# Patient Record
Sex: MALE | Race: WHITE
[De-identification: names, ages, dates, MRNs, and addresses within clinical notes are randomized per-mention and may not be internally consistent; named-entity substitution may affect disease eponyms.]

---

## 2017-03-13 ENCOUNTER — HOSPITAL ENCOUNTER (OUTPATIENT)
Dept: HOSPITAL 47 - LABWHC1 | Age: 67
End: 2017-03-13
Payer: MEDICARE

## 2017-03-13 DIAGNOSIS — J06.9: Primary | ICD-10-CM

## 2017-03-13 LAB
BASOPHILS # BLD AUTO: 0 K/UL (ref 0–0.2)
BASOPHILS NFR BLD AUTO: 0 %
CH: 30.6
CHCM: 31.7
EOSINOPHIL # BLD AUTO: 0.3 K/UL (ref 0–0.7)
EOSINOPHIL NFR BLD AUTO: 3 %
ERYTHROCYTE [DISTWIDTH] IN BLOOD BY AUTOMATED COUNT: 4.66 M/UL (ref 4.3–5.9)
ERYTHROCYTE [DISTWIDTH] IN BLOOD: 12.6 % (ref 11.5–15.5)
HCT VFR BLD AUTO: 45.2 % (ref 39–53)
HDW: 2.16
HGB BLD-MCNC: 14.2 GM/DL (ref 13–17.5)
LUC NFR BLD AUTO: 4 %
LYMPHOCYTES # SPEC AUTO: 2.8 K/UL (ref 1–4.8)
LYMPHOCYTES NFR SPEC AUTO: 32 %
MCH RBC QN AUTO: 30.5 PG (ref 25–35)
MCHC RBC AUTO-ENTMCNC: 31.4 G/DL (ref 31–37)
MCV RBC AUTO: 97 FL (ref 80–100)
MONOCYTES # BLD AUTO: 0.5 K/UL (ref 0–1)
MONOCYTES NFR BLD AUTO: 6 %
NEUTROPHILS # BLD AUTO: 4.8 K/UL (ref 1.3–7.7)
NEUTROPHILS NFR BLD AUTO: 55 %
WBC # BLD AUTO: 0.3 10*3/UL
WBC # BLD AUTO: 8.8 K/UL (ref 3.8–10.6)
WBC (PEROX): 8.91

## 2017-03-13 PROCEDURE — 87502 INFLUENZA DNA AMP PROBE: CPT

## 2017-03-13 PROCEDURE — 85025 COMPLETE CBC W/AUTO DIFF WBC: CPT

## 2017-03-13 PROCEDURE — 36415 COLL VENOUS BLD VENIPUNCTURE: CPT

## 2017-03-13 PROCEDURE — 99212 OFFICE O/P EST SF 10 MIN: CPT

## 2018-11-16 ENCOUNTER — HOSPITAL ENCOUNTER (EMERGENCY)
Dept: HOSPITAL 47 - EC | Age: 68
Discharge: HOME | End: 2018-11-16
Payer: MEDICARE

## 2018-11-16 VITALS
DIASTOLIC BLOOD PRESSURE: 89 MMHG | TEMPERATURE: 98.2 F | HEART RATE: 62 BPM | SYSTOLIC BLOOD PRESSURE: 135 MMHG | RESPIRATION RATE: 20 BRPM

## 2018-11-16 DIAGNOSIS — Z79.899: ICD-10-CM

## 2018-11-16 DIAGNOSIS — M94.0: Primary | ICD-10-CM

## 2018-11-16 DIAGNOSIS — R93.89: ICD-10-CM

## 2018-11-16 DIAGNOSIS — Z99.89: ICD-10-CM

## 2018-11-16 DIAGNOSIS — I10: ICD-10-CM

## 2018-11-16 DIAGNOSIS — Z79.82: ICD-10-CM

## 2018-11-16 DIAGNOSIS — Z79.891: ICD-10-CM

## 2018-11-16 DIAGNOSIS — G89.29: ICD-10-CM

## 2018-11-16 DIAGNOSIS — Z99.81: ICD-10-CM

## 2018-11-16 DIAGNOSIS — Z88.8: ICD-10-CM

## 2018-11-16 DIAGNOSIS — J44.9: ICD-10-CM

## 2018-11-16 DIAGNOSIS — Z87.891: ICD-10-CM

## 2018-11-16 DIAGNOSIS — Z91.041: ICD-10-CM

## 2018-11-16 DIAGNOSIS — G47.30: ICD-10-CM

## 2018-11-16 LAB
ALBUMIN SERPL-MCNC: 3.9 G/DL (ref 3.5–5)
ALP SERPL-CCNC: 64 U/L (ref 38–126)
ALT SERPL-CCNC: 22 U/L (ref 21–72)
ANION GAP SERPL CALC-SCNC: 7 MMOL/L
AST SERPL-CCNC: 24 U/L (ref 17–59)
BASOPHILS # BLD AUTO: 0 K/UL (ref 0–0.2)
BASOPHILS NFR BLD AUTO: 1 %
BUN SERPL-SCNC: 14 MG/DL (ref 9–20)
CALCIUM SPEC-MCNC: 9 MG/DL (ref 8.4–10.2)
CHLORIDE SERPL-SCNC: 99 MMOL/L (ref 98–107)
CK SERPL-CCNC: 93 U/L (ref 55–170)
CO2 SERPL-SCNC: 32 MMOL/L (ref 22–30)
EOSINOPHIL # BLD AUTO: 0.3 K/UL (ref 0–0.7)
EOSINOPHIL NFR BLD AUTO: 3 %
ERYTHROCYTE [DISTWIDTH] IN BLOOD BY AUTOMATED COUNT: 4.54 M/UL (ref 4.3–5.9)
ERYTHROCYTE [DISTWIDTH] IN BLOOD: 13 % (ref 11.5–15.5)
GLUCOSE SERPL-MCNC: 214 MG/DL (ref 74–99)
HCT VFR BLD AUTO: 43.3 % (ref 39–53)
HGB BLD-MCNC: 13.8 GM/DL (ref 13–17.5)
LYMPHOCYTES # SPEC AUTO: 2.1 K/UL (ref 1–4.8)
LYMPHOCYTES NFR SPEC AUTO: 28 %
MAGNESIUM SPEC-SCNC: 2.1 MG/DL (ref 1.6–2.3)
MCH RBC QN AUTO: 30.4 PG (ref 25–35)
MCHC RBC AUTO-ENTMCNC: 31.8 G/DL (ref 31–37)
MCV RBC AUTO: 95.5 FL (ref 80–100)
MONOCYTES # BLD AUTO: 0.5 K/UL (ref 0–1)
MONOCYTES NFR BLD AUTO: 7 %
NEUTROPHILS # BLD AUTO: 4.5 K/UL (ref 1.3–7.7)
NEUTROPHILS NFR BLD AUTO: 60 %
PLATELET # BLD AUTO: 313 K/UL (ref 150–450)
POTASSIUM SERPL-SCNC: 4.5 MMOL/L (ref 3.5–5.1)
PROT SERPL-MCNC: 6.9 G/DL (ref 6.3–8.2)
SODIUM SERPL-SCNC: 138 MMOL/L (ref 137–145)
TROPONIN I SERPL-MCNC: <0.012 NG/ML (ref 0–0.03)
WBC # BLD AUTO: 7.5 K/UL (ref 3.8–10.6)

## 2018-11-16 PROCEDURE — 36415 COLL VENOUS BLD VENIPUNCTURE: CPT

## 2018-11-16 PROCEDURE — 84484 ASSAY OF TROPONIN QUANT: CPT

## 2018-11-16 PROCEDURE — 83735 ASSAY OF MAGNESIUM: CPT

## 2018-11-16 PROCEDURE — 82550 ASSAY OF CK (CPK): CPT

## 2018-11-16 PROCEDURE — 85025 COMPLETE CBC W/AUTO DIFF WBC: CPT

## 2018-11-16 PROCEDURE — 93005 ELECTROCARDIOGRAM TRACING: CPT

## 2018-11-16 PROCEDURE — 85379 FIBRIN DEGRADATION QUANT: CPT

## 2018-11-16 PROCEDURE — 82553 CREATINE MB FRACTION: CPT

## 2018-11-16 PROCEDURE — 99285 EMERGENCY DEPT VISIT HI MDM: CPT

## 2018-11-16 PROCEDURE — 80053 COMPREHEN METABOLIC PANEL: CPT

## 2018-11-16 PROCEDURE — 96374 THER/PROPH/DIAG INJ IV PUSH: CPT

## 2018-11-16 PROCEDURE — 96375 TX/PRO/DX INJ NEW DRUG ADDON: CPT

## 2018-11-16 PROCEDURE — 71275 CT ANGIOGRAPHY CHEST: CPT

## 2018-11-16 NOTE — CT
EXAMINATION TYPE: CT angio chest

 

DATE OF EXAM: 11/16/2018 5:06 PM

 

COMPARISON: None

 

HISTORY: SOB. hx blood cot in his leg 2 yrs ago

 

CT DLP: 696.6 mGycm

Automated exposure control for dose reduction was used.

 

CONTRAST: 

CTA scan of the thorax is performed with IV Contrast, patient injected with 100 mL of Isovue 370, pul
monary embolism protocol.  There are 3-D post processed images..  

 

FINDINGS:

 

 

There is pulmonary emphysema. There is bullous disease throughout both lungs. There is some patchy re
ticular nodular infiltrate at the lung bases and more on the left side. There is no evidence of a pul
monary mass. There is no pleural effusion. Heart size is normal. There is no pericardial effusion. Th
oracic aorta is intact without evidence of aneurysm or dissection. I see no filling defects in the pu
lmonary arteries. There are no hilar masses. There is no mediastinal adenopathy. The bony thorax is i
ntact. There is spurring in the thoracic spine. The ribs appear intact.

 

IMPRESSION: 

NO EVIDENCE OF PULMONARY EMBOLISM.

 

EMPHYSEMA. RETICULAR NODULAR INFILTRATES AT THE LUNG BASES CONSISTENT WITH INFLAMMATORY DISEASE.

## 2018-11-16 NOTE — ED
General Adult HPI





- General


Chief complaint: Shortness of Breath


Stated complaint: Blood clot in right lung


Time Seen by Provider: 11/16/18 15:28


Source: patient, RN notes reviewed


Mode of arrival: wheelchair


Limitations: no limitations





- History of Present Illness


Initial comments: 





This is a 8-year-old male with a history of pneumonia about a month ago and a 

history of DVT to his right lower extremity about 2 years ago who states he was 

sent over from a doctor's office with a request for evaluation for possible 

pulmonary embolus.  Patient's had right-sided anterior chest pain sharp in 

nature it does get worse with movement and deep breathing.  He was on 

antibiotics for the pneumonia that is cleared up with chest pain is been 

persistent.  Sharp in nature mild to moderate in severity.  He denies any other 

complaints she denies any leg or calf pain and swelling.  Fevers chills nausea 

vomiting sweats or other symptoms at this time.





- Related Data


 Home Medications











 Medication  Instructions  Recorded  Confirmed


 


Multivitamin [Men's Multi-Vitamin] 1 tab PO DAILY 11/23/15 11/16/18


 


Spironolactone [Aldactone] 25 mg PO DAILY 11/23/15 11/16/18


 


Cholecalciferol [Vitamin D3] 400 unit PO DAILY 06/23/16 11/16/18


 


Dilaudid Through Pump 3.5 mg INTRATHECA CONTINUOUS 06/23/16 11/16/18


 


EPINEPHrine [Epipen Jr 2-Logan] 0.15 mg IM ONCE PRN 06/23/16 11/16/18


 


Lisinopril 5 mg PO DAILY 06/23/16 11/16/18


 


Omega-3 Fatty Acids/Fish Oil [Fish 1 cap PO DAILY 06/23/16 11/16/18





Oil 1,000 mg Softgel]   


 


Aspirin EC [Ecotrin Low Dose] 81 mg PO DAILY 11/16/18 11/16/18


 


Furosemide [Lasix] 20 mg PO DAILY 11/16/18 11/16/18








 Previous Rx's











 Medication  Instructions  Recorded


 


Ibuprofen 800 mg PO Q6HR PRN #20 tablet 11/16/18


 


Ketorolac [Toradol] 10 mg PO Q6HR #20 tab 11/16/18











 Allergies











Allergy/AdvReac Type Severity Reaction Status Date / Time


 


Iodinated Contrast- Oral and Allergy  Anaphylaxis Verified 11/16/18 16:15





IV Dye     





[Iodinated Contrast Media -     





IV Dye]     


 


metformin AdvReac  Abdominal Verified 11/16/18 16:15





   Pain  














Review of Systems


ROS Statement: 


Those systems with pertinent positive or pertinent negative responses have been 

documented in the HPI.





ROS Other: All systems not noted in ROS Statement are negative.





Past Medical History


Past Medical History: COPD, Hypertension, Pneumonia, Sleep Apnea/CPAP/BIPAP


Additional Past Medical History / Comment(s): Hx. of Diverticulitis, Pneumonia 

2 yrs. ago, wears oxygen @ 5L. Complains of L sided abd. pain.  chronic back 

pain


History of Any Multi-Drug Resistant Organisms: None Reported


Past Surgical History: Appendectomy, Cholecystectomy


Additional Past Surgical History / Comment(s): Colonoscopy


Past Anesthesia/Blood Transfusion Reactions: No Reported Reaction


Past Psychological History: No Psychological Hx Reported


Smoking Status: Former smoker


Past Alcohol Use History: None Reported, Rare


Past Drug Use History: None Reported





- Past Family History


  ** Brother(s)


Family Medical History: Cancer


Additional Family Medical History / Comment(s): Colon





General Exam





- General Exam Comments


Initial Comments: 





This is a well-developed well-nourished awake alert oriented 5 male


Limitations: no limitations


General appearance: alert, in no apparent distress


Head exam: Present: atraumatic, normocephalic, normal inspection


Eye exam: Present: normal appearance, PERRL, EOMI.  Absent: scleral icterus, 

conjunctival injection, periorbital swelling


ENT exam: Present: normal exam, mucous membranes moist


Neck exam: Present: normal inspection.  Absent: tenderness, meningismus, 

lymphadenopathy


Respiratory exam: Present: normal lung sounds bilaterally, chest wall 

tenderness (Sweats patient across the anterior right chest wall at the costal 

sternal margin.  No step-off or crepitation.).  Absent: respiratory distress, 

wheezes, rales, rhonchi, stridor


Cardiovascular Exam: Present: regular rate, normal rhythm, normal heart sounds.

  Absent: systolic murmur, diastolic murmur, rubs, gallop, clicks


GI/Abdominal exam: Present: soft, normal bowel sounds.  Absent: distended, 

tenderness, guarding, rebound, rigid


Extremities exam: Present: normal inspection, full ROM, normal capillary refill

, other (No Homans sign).  Absent: tenderness, pedal edema, joint swelling, 

calf tenderness


Back exam: Present: normal inspection


Neurological exam: Present: alert, oriented X3, CN II-XII intact


Psychiatric exam: Present: normal affect, normal mood


Skin exam: Present: warm, dry, intact, normal color.  Absent: rash





Course


 Vital Signs











  11/16/18 11/16/18





  15:21 16:03


 


Temperature 98.4 F 


 


Pulse Rate 69 


 


Respiratory 16 24





Rate  


 


Blood Pressure 154/82 


 


O2 Sat by Pulse 95 





Oximetry  














EKG Findings





- EKG Results:


EKG: interpreted by ERMD, sinus rhythm (Sinus rhythm with sinus arrhythmia rate 

was 60.  A 150 QRS duration 96 daily since /434 low-voltage QRS 

nonspecific T-wave configuration)





Medical Decision Making





- Medical Decision Making





I did have a long discussion the patient regarding the findings the 

presentation is consistent with costochondritis





- Lab Data


Result diagrams: 


 11/16/18 16:00





 11/16/18 16:00


 Lab Results











  11/16/18 11/16/18 11/16/18 Range/Units





  16:00 16:00 16:00 


 


WBC   7.5   (3.8-10.6)  k/uL


 


RBC   4.54   (4.30-5.90)  m/uL


 


Hgb   13.8   (13.0-17.5)  gm/dL


 


Hct   43.3   (39.0-53.0)  %


 


MCV   95.5   (80.0-100.0)  fL


 


MCH   30.4   (25.0-35.0)  pg


 


MCHC   31.8   (31.0-37.0)  g/dL


 


RDW   13.0   (11.5-15.5)  %


 


Plt Count   313   (150-450)  k/uL


 


Neutrophils %   60   %


 


Lymphocytes %   28   %


 


Monocytes %   7   %


 


Eosinophils %   3   %


 


Basophils %   1   %


 


Neutrophils #   4.5   (1.3-7.7)  k/uL


 


Lymphocytes #   2.1   (1.0-4.8)  k/uL


 


Monocytes #   0.5   (0-1.0)  k/uL


 


Eosinophils #   0.3   (0-0.7)  k/uL


 


Basophils #   0.0   (0-0.2)  k/uL


 


D-Dimer     (<0.60)  mg/L FEU


 


Sodium    138  (137-145)  mmol/L


 


Potassium    4.5  (3.5-5.1)  mmol/L


 


Chloride    99  ()  mmol/L


 


Carbon Dioxide    32 H  (22-30)  mmol/L


 


Anion Gap    7  mmol/L


 


BUN    14  (9-20)  mg/dL


 


Creatinine    0.56 L  (0.66-1.25)  mg/dL


 


Est GFR (CKD-EPI)AfAm    >90  (>60 ml/min/1.73 sqM)  


 


Est GFR (CKD-EPI)NonAf    >90  (>60 ml/min/1.73 sqM)  


 


Glucose    214 H  (74-99)  mg/dL


 


Calcium    9.0  (8.4-10.2)  mg/dL


 


Magnesium    2.1  (1.6-2.3)  mg/dL


 


Total Bilirubin    0.4  (0.2-1.3)  mg/dL


 


AST    24  (17-59)  U/L


 


ALT    22  (21-72)  U/L


 


Alkaline Phosphatase    64  ()  U/L


 


Total Creatine Kinase  93    ()  U/L


 


CK-MB (CK-2)  1.9    (0.0-2.4)  ng/mL


 


CK-MB (CK-2) Rel Index  2.0    


 


Troponin I  <0.012    (0.000-0.034)  ng/mL


 


Total Protein    6.9  (6.3-8.2)  g/dL


 


Albumin    3.9  (3.5-5.0)  g/dL














  11/16/18 Range/Units





  16:00 


 


WBC   (3.8-10.6)  k/uL


 


RBC   (4.30-5.90)  m/uL


 


Hgb   (13.0-17.5)  gm/dL


 


Hct   (39.0-53.0)  %


 


MCV   (80.0-100.0)  fL


 


MCH   (25.0-35.0)  pg


 


MCHC   (31.0-37.0)  g/dL


 


RDW   (11.5-15.5)  %


 


Plt Count   (150-450)  k/uL


 


Neutrophils %   %


 


Lymphocytes %   %


 


Monocytes %   %


 


Eosinophils %   %


 


Basophils %   %


 


Neutrophils #   (1.3-7.7)  k/uL


 


Lymphocytes #   (1.0-4.8)  k/uL


 


Monocytes #   (0-1.0)  k/uL


 


Eosinophils #   (0-0.7)  k/uL


 


Basophils #   (0-0.2)  k/uL


 


D-Dimer  0.76 H  (<0.60)  mg/L FEU


 


Sodium   (137-145)  mmol/L


 


Potassium   (3.5-5.1)  mmol/L


 


Chloride   ()  mmol/L


 


Carbon Dioxide   (22-30)  mmol/L


 


Anion Gap   mmol/L


 


BUN   (9-20)  mg/dL


 


Creatinine   (0.66-1.25)  mg/dL


 


Est GFR (CKD-EPI)AfAm   (>60 ml/min/1.73 sqM)  


 


Est GFR (CKD-EPI)NonAf   (>60 ml/min/1.73 sqM)  


 


Glucose   (74-99)  mg/dL


 


Calcium   (8.4-10.2)  mg/dL


 


Magnesium   (1.6-2.3)  mg/dL


 


Total Bilirubin   (0.2-1.3)  mg/dL


 


AST   (17-59)  U/L


 


ALT   (21-72)  U/L


 


Alkaline Phosphatase   ()  U/L


 


Total Creatine Kinase   ()  U/L


 


CK-MB (CK-2)   (0.0-2.4)  ng/mL


 


CK-MB (CK-2) Rel Index   


 


Troponin I   (0.000-0.034)  ng/mL


 


Total Protein   (6.3-8.2)  g/dL


 


Albumin   (3.5-5.0)  g/dL














- Radiology Data


Radiology results: report reviewed (Patient CAT scan is negative for acute 

findings no PE there is some inflammatory changes this would be consistent with 

the patient's resolving pneumonia.), image reviewed





Disposition


Clinical Impression: 


 Chest wall pain, Costochondritis, acute





Disposition: HOME SELF-CARE


Condition: Good


Instructions:  Costochondritis (ED), Chest Wall Pain (ED)


Prescriptions: 


Ibuprofen 800 mg PO Q6HR PRN #20 tablet


 PRN Reason: Pain


Ketorolac [Toradol] 10 mg PO Q6HR #20 tab


Is patient prescribed a controlled substance at d/c from ED?: No


Referrals: 


Isac Hayward MD [Primary Care Provider] - 1-2 days

## 2019-10-03 NOTE — P.GSHP
History of Present Illness


H&P Date: 10/04/19














CHIEF COMPLAINT: GERD





HISTORY OF PRESENT ILLNESS: The patient is a 69-year-old male who


presents reports gastroesophageal reflux disease.  Upper endoscopy was offered 

for further evaluation and management.





PAST MEDICAL HISTORY: 


Please see list.





PAST SURGICAL HISTORY: 


Please see list.





MEDICATIONS: 


Please see list.





ALLERGIES:  Please see list. 





SOCIAL HISTORY: No illicit drug use





FAMILY HISTORY: No reports of Crohn disease or ulcerative colitis. 





REVIEW OF ORGAN SYSTEMS: 


CONSTITUTIONAL: No reports of fevers or chills. 


GI:  Denies any blood in stools or constipation. 





PHYSICAL EXAM: 


VITAL SIGNS:  Stable


GENERAL: Well-developed and pleasant in no acute distress. 


HEENT: No scleral icterus. Extraocular movements grossly


intact. Moist buccal mucosa. 


NECK: Supple without lymphadenopathy. 


CHEST: Unlabored respirations. Equal bilateral excursions. 


CARDIOVASCULAR: Regular rate and rhythm. Distal 2+ pulses. 


ABDOMEN: Soft, nondistended.  


MUSCULOSKELETAL: No clubbing, cyanosis, or edema. 





ASSESSMENT: 


1.  Gastroesophageal reflux disease





PLAN: 


1. Recommend proceeding with an upper endoscopy





Past Medical History


Past Medical History: COPD, Deep Vein Thrombosis (DVT), GERD/Reflux, 

Hypertension, Pneumonia, Sleep Apnea/CPAP/BIPAP


Additional Past Medical History / Comment(s): Hx. of Diverticulitis, Pneumonia 2

yrs. ago, uses oxygen @ 5L,  chronic back pain, right upper quadrant pain, uses 

CPAP, hx. DVT leg 4 yrs ago


History of Any Multi-Drug Resistant Organisms: None Reported


Past Surgical History: Appendectomy, Bowel Resection, Cholecystectomy


Additional Past Surgical History / Comment(s): Colonoscopy, exp. laparotomy


Past Anesthesia/Blood Transfusion Reactions: No Reported Reaction


Smoking Status: Former smoker





- Past Family History


  ** Brother(s)


Family Medical History: Cancer


Additional Family Medical History / Comment(s): Colon





Medications and Allergies


                                Home Medications











 Medication  Instructions  Recorded  Confirmed  Type


 


Multivitamin [Men's Multi-Vitamin] 1 tab PO DAILY 11/23/15 10/03/19 History


 


Spironolactone [Aldactone] 25 mg PO DAILY 11/23/15 10/03/19 History


 


Cholecalciferol [Vitamin D3] 400 unit PO DAILY 06/23/16 10/03/19 History


 


Dilaudid Through Pump 3.5 mg INTRATHECA CONTINUOUS 06/23/16 10/03/19 History


 


EPINEPHrine [Epipen Jr 2-Logan] 0.15 mg IM ONCE PRN 06/23/16 10/03/19 History


 


Lisinopril 5 mg PO DAILY 06/23/16 10/03/19 History


 


Omega-3 Fatty Acids/Fish Oil [Fish 1 cap PO DAILY 06/23/16 10/03/19 History





Oil 1,000 mg Softgel]    


 


Aspirin EC [Ecotrin Low Dose] 81 mg PO DAILY 11/16/18 10/03/19 History


 


Furosemide [Lasix] 20 mg PO DAILY 11/16/18 10/03/19 History


 


Ibuprofen 800 mg PO Q6HR PRN #20 tablet 11/16/18 10/03/19 Rx


 


Albuterol Nebulized [Ventolin 2.5 mg INHALATION Q6H 10/03/19 10/03/19 History





Nebulized]    


 


Albuterol Sulfate [Proair Hfa] 1 - 2 puff INHALATION Q6HR PRN 10/03/19 10/03/19 

History


 


Atorvastatin [Lipitor] 10 mg PO DAILY 10/03/19 10/03/19 History








                                    Allergies











Allergy/AdvReac Type Severity Reaction Status Date / Time


 


Iodinated Contrast Media Allergy  Anaphylaxis Verified 10/03/19 09:47





[Iodinated Contrast Media -     





IV Dye]     


 


metformin AdvReac  Abdominal Verified 10/03/19 09:47





   Pain

## 2019-10-04 ENCOUNTER — HOSPITAL ENCOUNTER (OUTPATIENT)
Dept: HOSPITAL 47 - ORWHC2ENDO | Age: 69
Discharge: HOME | End: 2019-10-04
Payer: MEDICARE

## 2019-10-04 VITALS — BODY MASS INDEX: 39.2 KG/M2

## 2019-10-04 VITALS — HEART RATE: 77 BPM | RESPIRATION RATE: 18 BRPM | DIASTOLIC BLOOD PRESSURE: 73 MMHG | SYSTOLIC BLOOD PRESSURE: 123 MMHG

## 2019-10-04 VITALS — TEMPERATURE: 98.3 F

## 2019-10-04 DIAGNOSIS — E66.01: ICD-10-CM

## 2019-10-04 DIAGNOSIS — K31.89: ICD-10-CM

## 2019-10-04 DIAGNOSIS — G89.29: ICD-10-CM

## 2019-10-04 DIAGNOSIS — E78.5: ICD-10-CM

## 2019-10-04 DIAGNOSIS — Z87.01: ICD-10-CM

## 2019-10-04 DIAGNOSIS — K29.50: Primary | ICD-10-CM

## 2019-10-04 DIAGNOSIS — Z86.718: ICD-10-CM

## 2019-10-04 DIAGNOSIS — Z91.038: ICD-10-CM

## 2019-10-04 DIAGNOSIS — Z99.89: ICD-10-CM

## 2019-10-04 DIAGNOSIS — G47.30: ICD-10-CM

## 2019-10-04 DIAGNOSIS — Z88.8: ICD-10-CM

## 2019-10-04 DIAGNOSIS — Z87.19: ICD-10-CM

## 2019-10-04 DIAGNOSIS — Z87.891: ICD-10-CM

## 2019-10-04 DIAGNOSIS — J44.9: ICD-10-CM

## 2019-10-04 DIAGNOSIS — K21.0: ICD-10-CM

## 2019-10-04 DIAGNOSIS — Z79.891: ICD-10-CM

## 2019-10-04 DIAGNOSIS — I10: ICD-10-CM

## 2019-10-04 DIAGNOSIS — Z80.0: ICD-10-CM

## 2019-10-04 DIAGNOSIS — K22.10: ICD-10-CM

## 2019-10-04 DIAGNOSIS — Z79.82: ICD-10-CM

## 2019-10-04 DIAGNOSIS — Z91.041: ICD-10-CM

## 2019-10-04 DIAGNOSIS — Z79.899: ICD-10-CM

## 2019-10-04 DIAGNOSIS — Z90.49: ICD-10-CM

## 2019-10-04 DIAGNOSIS — K44.9: ICD-10-CM

## 2019-10-04 PROCEDURE — 88305 TISSUE EXAM BY PATHOLOGIST: CPT

## 2019-10-04 PROCEDURE — 43239 EGD BIOPSY SINGLE/MULTIPLE: CPT

## 2019-10-30 ENCOUNTER — HOSPITAL ENCOUNTER (OUTPATIENT)
Dept: HOSPITAL 47 - RADCTMAIN | Age: 69
Discharge: HOME | End: 2019-10-30
Payer: MEDICARE

## 2019-10-30 DIAGNOSIS — K57.90: Primary | ICD-10-CM

## 2019-10-30 DIAGNOSIS — K22.8: ICD-10-CM

## 2019-10-30 DIAGNOSIS — K57.32: ICD-10-CM

## 2019-10-30 LAB — BUN SERPL-SCNC: 14 MG/DL (ref 9–20)

## 2019-10-30 PROCEDURE — 74220 X-RAY XM ESOPHAGUS 1CNTRST: CPT

## 2019-10-30 PROCEDURE — 84520 ASSAY OF UREA NITROGEN: CPT

## 2019-10-30 PROCEDURE — 74177 CT ABD & PELVIS W/CONTRAST: CPT

## 2019-10-30 PROCEDURE — 36415 COLL VENOUS BLD VENIPUNCTURE: CPT

## 2019-10-30 PROCEDURE — 82565 ASSAY OF CREATININE: CPT

## 2019-10-30 NOTE — CT
EXAMINATION TYPE: CT abdomen pelvis w con

 

DATE OF EXAM: 10/30/2019

 

COMPARISON: 6/23/2016

 

INDICATION: Diverticulitis

 

DLP: 2099.50 mGycm, Automated exposure control for dose reduction was used.

 

CONTRAST:  100 ml mL of Isovue 300. 

                        Study performed with Oral Contrast

 

TECHNIQUE: Axial images were obtained from above the diaphragm to the pubic rami in the axial plane a
t 5 mm thick sections.  Reconstructed images are reviewed on the computer in the coronal plane.  

 

FINDINGS:

 

Limited CT sections are obtained the lung bases.  The lung bases are clear.

 

CT ABDOMEN:

Electronic device overlies anterolateral right abdomen.

 

Liver: Normal

 

Spleen: Normal

 

Pancreas: Fatty infiltration to the pancreas.

 

Adrenal glands: The adrenal glands are normal.

 

Gallbladder: Normal  

 

Kidneys: No masses are evident. No hydronephrosis is present.   No cysts are present.  Delayed images
 were obtained through the kidneys, which remain unremarkable.

 

Aorta: Vascular calcification is within the aorta. 

 

Inferior vena cava: Normal.

 

CT PELVIS: 

Loops of bowel within the abdomen and pelvis are normal.     There are some loops of bowel with limit
ed distention or lacking oral contrast limiting their evaluation. Prior bowel surgeries at the distal
 sigmoid colon. Scattered diverticuli may be present. No acute diverticulitis is evident.

 

Appendix: Normal as visualized.

 

Urinary bladder: Normal. 

 

Genitourinary structures: Prostate appears normal.

 

Osseous structures: No suspicious lytic or sclerotic lesions. Some facet degenerative changes present
.

 

IMPRESSIONS:

1.  No suspicious acute changes evident.

2. Mild diverticulosis without acute diverticulitis.

## 2019-10-30 NOTE — FL
EXAMINATION TYPE: FL barium swallow

 

DATE OF EXAM: 10/30/2019

 

COMPARISON: None

 

HISTORY: Gastroesophageal reflux, abdominal pain always feels hungry

 

TECHNIQUE: Double air contrast technique

Fluoroscopy time: 1 minute 3 seconds

Images: 16

 

FINDINGS:

The esophagus has a normal course to the distal esophageal junction. Multiple secondary and tertiary 
contractions are evident throughout the examination. Esophagus dilates to normal caliber. No intralum
inal defects are evident. Extrarenal defects are not evident.

 

Note is made of aspiration during the exam.

 

There is incomplete stripping esophageal bolus the horizontal drinking position. As incomplete stripp
ing of the esophageal bolus and horizontal drinking position. 

 

IMPRESSIONS:

1. Presbyesophagus. Multiple large tertiary as well as secondary contractions were evident during exa
m.

2. Aspiration during the exam.

## 2019-12-04 ENCOUNTER — HOSPITAL ENCOUNTER (OUTPATIENT)
Dept: HOSPITAL 47 - ORWHC2ENDO | Age: 69
Discharge: HOME | End: 2019-12-04
Payer: MEDICARE

## 2019-12-04 VITALS — BODY MASS INDEX: 37.2 KG/M2

## 2019-12-04 VITALS — TEMPERATURE: 98.3 F

## 2019-12-04 VITALS — RESPIRATION RATE: 18 BRPM | DIASTOLIC BLOOD PRESSURE: 89 MMHG | SYSTOLIC BLOOD PRESSURE: 151 MMHG | HEART RATE: 68 BPM

## 2019-12-04 DIAGNOSIS — Z90.49: ICD-10-CM

## 2019-12-04 DIAGNOSIS — Z80.0: ICD-10-CM

## 2019-12-04 DIAGNOSIS — K22.4: ICD-10-CM

## 2019-12-04 DIAGNOSIS — F90.9: ICD-10-CM

## 2019-12-04 DIAGNOSIS — K21.0: ICD-10-CM

## 2019-12-04 DIAGNOSIS — Z86.718: ICD-10-CM

## 2019-12-04 DIAGNOSIS — G89.29: ICD-10-CM

## 2019-12-04 DIAGNOSIS — E66.01: ICD-10-CM

## 2019-12-04 DIAGNOSIS — K22.8: ICD-10-CM

## 2019-12-04 DIAGNOSIS — Z87.891: ICD-10-CM

## 2019-12-04 DIAGNOSIS — J44.9: ICD-10-CM

## 2019-12-04 DIAGNOSIS — G62.9: ICD-10-CM

## 2019-12-04 DIAGNOSIS — Z96.9: ICD-10-CM

## 2019-12-04 DIAGNOSIS — Z87.01: ICD-10-CM

## 2019-12-04 DIAGNOSIS — Z99.89: ICD-10-CM

## 2019-12-04 DIAGNOSIS — Z79.899: ICD-10-CM

## 2019-12-04 DIAGNOSIS — I10: ICD-10-CM

## 2019-12-04 DIAGNOSIS — Z79.82: ICD-10-CM

## 2019-12-04 DIAGNOSIS — Z91.041: ICD-10-CM

## 2019-12-04 DIAGNOSIS — K29.50: Primary | ICD-10-CM

## 2019-12-04 DIAGNOSIS — Z91.030: ICD-10-CM

## 2019-12-04 DIAGNOSIS — G47.33: ICD-10-CM

## 2019-12-04 DIAGNOSIS — Z88.8: ICD-10-CM

## 2019-12-04 LAB — GLUCOSE BLD-MCNC: 139 MG/DL (ref 75–99)

## 2019-12-04 PROCEDURE — 43249 ESOPH EGD DILATION <30 MM: CPT

## 2019-12-04 PROCEDURE — 43248 EGD GUIDE WIRE INSERTION: CPT

## 2019-12-04 PROCEDURE — 43239 EGD BIOPSY SINGLE/MULTIPLE: CPT

## 2019-12-04 PROCEDURE — 88305 TISSUE EXAM BY PATHOLOGIST: CPT

## 2019-12-04 NOTE — P.GSHP
History of Present Illness


H&P Date: 12/04/19














CHIEF COMPLAINT: GERD





HISTORY OF PRESENT ILLNESS: The patient is a 69-year-old male who


presents reports gastroesophageal reflux disease.  Upper endoscopy was offered 

for further evaluation and management.





PAST MEDICAL HISTORY: 


Please see list.





PAST SURGICAL HISTORY: 


Please see list.





MEDICATIONS: 


Please see list.





ALLERGIES:  Please see list. 





SOCIAL HISTORY: No illicit drug use





FAMILY HISTORY: No reports of Crohn disease or ulcerative colitis. 





REVIEW OF ORGAN SYSTEMS: 


CONSTITUTIONAL: No reports of fevers or chills. 


GI:  Denies any blood in stools or constipation. 





PHYSICAL EXAM: 


VITAL SIGNS:  Stable


GENERAL: Well-developed and pleasant in no acute distress. 


HEENT: No scleral icterus. Extraocular movements grossly


intact. Moist buccal mucosa. 


NECK: Supple without lymphadenopathy. 


CHEST: Unlabored respirations. Equal bilateral excursions. 


CARDIOVASCULAR: Regular rate and rhythm. Distal 2+ pulses. 


ABDOMEN: Soft, nondistended.  


MUSCULOSKELETAL: No clubbing, cyanosis, or edema. 





ASSESSMENT: 


1.  Gastroesophageal reflux disease





PLAN: 


1. Recommend proceeding with an upper endoscopy





Past Medical History


Past Medical History: COPD, Deep Vein Thrombosis (DVT), GERD/Reflux, 

Hypertension, Pneumonia, Sleep Apnea/CPAP/BIPAP


Additional Past Medical History / Comment(s): Hx. of Diverticulitis, Pneumonia 

(2018),  oxygen @ 5L ATC,  chronic back pain,  uses CPAP, hx. DVT leg , 

neuropathy feet, hx of pancreatitis, steroid induced hyperglycemia., having 

stomach pain and dysphagia.


History of Any Multi-Drug Resistant Organisms: None Reported


Past Surgical History: Appendectomy, Bowel Resection, Cholecystectomy


Additional Past Surgical History / Comment(s): Colonoscopy, exp. laparotomy, 

EGD, LASIK EYE SURGERY.


Past Anesthesia/Blood Transfusion Reactions: No Reported Reaction


Past Psychological History: No Psychological Hx Reported


Smoking Status: Former smoker


Past Alcohol Use History: Rare


Additional Past Alcohol Use History / Comment(s): quit smoking 2001, smoked 1-

2ppd since age of 18


Past Drug Use History: None Reported





- Past Family History


  ** Brother(s)


Family Medical History: Cancer


Additional Family Medical History / Comment(s): Colon





Medications and Allergies


                                Home Medications











 Medication  Instructions  Recorded  Confirmed  Type


 


Spironolactone [Aldactone] 25 mg PO DAILY 11/23/15 12/03/19 History


 


Aspirin EC [Ecotrin Low Dose] 81 mg PO DAILY 11/16/18 12/03/19 History


 


Albuterol Nebulized [Ventolin 2.5 mg INHALATION QID 10/03/19 12/03/19 History





Nebulized]    


 


Albuterol Sulfate [Proair Hfa] 1 - 2 puff INHALATION BID 10/03/19 12/03/19 

History


 


Furosemide [Lasix] 40 mg PO DAILY 12/03/19 12/03/19 History


 


Gabapentin [Neurontin] 100 mg PO TID 12/03/19 12/03/19 History


 


Indomethacin [Indocin] 25 mg PO TID 12/03/19 12/03/19 History


 


Ondansetron [Zofran] 4 mg PO Q8HR PRN 12/03/19 12/03/19 History








                                    Allergies











Allergy/AdvReac Type Severity Reaction Status Date / Time


 


Iodinated Contrast Media Allergy  Anaphylaxis Verified 12/03/19 08:32





[Iodinated Contrast Media -     





IV Dye]     


 


metformin AdvReac  Abdominal Verified 12/03/19 08:32





   Pain

## 2019-12-04 NOTE — P.PCN
Date of Procedure: 12/04/19


Description of Procedure: 




















PREOPERATIVE DIAGNOSIS:


Dysphagia.


Gastroesophageal reflux disease


Morbid obesity excess calories, BMI 39.6


Esophageal dysmotility


Presbyesophagus





POSTOPERATIVE DIAGNOSIS:


Dysphagia.


Gastroesophageal reflux disease


Morbid obesity excess calories, BMI 39.6


Esophageal dysmotility


Presbyesophagus


Gastritis





OPERATION:


Esophagogastroduodenoscopy with rigid dilator over the guidewire 57 Fr.


Esophagogastroduodenoscopy with cold forceps biopsies along antrum





SURGEON: Yamila Martin MD





ANESTHESIA: MAC.





INDICATIONS:


The patient is a 69-year-old male who presents with a history of dysphagia and 

esophageal dysmotility. Benefits and risks of the procedure were described. In

formed consent was obtained.





DESCRIPTION:


The patient was brought into the endoscopy suite and laid in the left lateral 

decubitus position.  After a timeout was confirmed, the procedure was initiated.




An Olympus gastroscope was passed with esophageal dysmotility and the stomach 

was entered.  Mild gastritis was identified.  The scope was advanced to the 

duodenum which was unremarkable.  





Next using an American rigid dilator, a guidewire was placed through the 

pediatric gastroscope.  Next the scope was withdrawn.  A 57-French rigid 

American dilator  was passed carefully along the posterior oropharynx to 50 cm 

and left in place for 2-3 minutes stretch. The dilator was withdrawn including 

the guidewire.  The scope was reentered along the posterior oropharynx with no 

findings of full-thickness tear of the upper esophageal sphincter.  Next, 

inflammation of the antrum was identified with cold forceps biopsies obtained.  





No full-thickness injury was encountered.  The GI tract was desufflated.  The 

patient tolerated the procedure well.  





FINDINGS:


Squamocolumnar junction unremarkable at 40 cm.


American rigid dilator 57-French completed.


Diffuse gastritis.


Hill grade 2 lower esophageal valve.


LA grade B esophagitis.





RECOMMENDATIONS:


Upper endoscopy as needed





Plan - Discharge Summary


New Discharge Prescriptions: 


No Action


   Spironolactone [Aldactone] 25 mg PO DAILY


   Aspirin EC [Ecotrin Low Dose] 81 mg PO DAILY


   Albuterol Sulfate [Proair Hfa] 1 - 2 puff INHALATION BID


   Albuterol Nebulized [Ventolin Nebulized] 2.5 mg INHALATION QID


   Ondansetron [Zofran] 4 mg PO Q8HR PRN


     PRN Reason: Nausea


   Gabapentin [Neurontin] 100 mg PO TID


   Furosemide [Lasix] 40 mg PO DAILY


   Indomethacin [Indocin] 25 mg PO TID


   Dextroamphetamine/Amphetamine [Adderall] 30 mg PO DAILY


   Nf Dilaudid 4.5 mg INTRADERMA CONTINUOUS


Discharge Medication List





Spironolactone [Aldactone] 25 mg PO DAILY 11/23/15 [History]


Aspirin EC [Ecotrin Low Dose] 81 mg PO DAILY 11/16/18 [History]


Albuterol Nebulized [Ventolin Nebulized] 2.5 mg INHALATION QID 10/03/19 

[History]


Albuterol Sulfate [Proair Hfa] 1 - 2 puff INHALATION BID 10/03/19 [History]


Furosemide [Lasix] 40 mg PO DAILY 12/03/19 [History]


Gabapentin [Neurontin] 100 mg PO TID 12/03/19 [History]


Indomethacin [Indocin] 25 mg PO TID 12/03/19 [History]


Ondansetron [Zofran] 4 mg PO Q8HR PRN 12/03/19 [History]


Dextroamphetamine/Amphetamine [Adderall] 30 mg PO DAILY 12/04/19 [History]


Nf Dilaudid 4.5 mg INTRADERMA CONTINUOUS 12/04/19 [History]








Follow up Appointment(s)/Referral(s): 


Yamila Martin MD [STAFF PHYSICIAN] - 12/17/19


Patient Instructions/Handouts:  Esophageal Dilation (DC), Complete Blenderized 

Diet (DC)


Activity/Diet/Wound Care/Special Instructions: 


Ground or pured diet


Discharge Disposition: HOME SELF-CARE

## 2020-03-04 ENCOUNTER — HOSPITAL ENCOUNTER (OUTPATIENT)
Dept: HOSPITAL 47 - ORWHC2ENDO | Age: 70
Discharge: HOME | End: 2020-03-04
Payer: MEDICARE

## 2020-03-04 VITALS — HEART RATE: 70 BPM | DIASTOLIC BLOOD PRESSURE: 82 MMHG | SYSTOLIC BLOOD PRESSURE: 130 MMHG | RESPIRATION RATE: 20 BRPM

## 2020-03-04 VITALS — BODY MASS INDEX: 38.9 KG/M2

## 2020-03-04 VITALS — TEMPERATURE: 97.4 F

## 2020-03-04 DIAGNOSIS — R13.10: ICD-10-CM

## 2020-03-04 DIAGNOSIS — Z87.01: ICD-10-CM

## 2020-03-04 DIAGNOSIS — G89.29: ICD-10-CM

## 2020-03-04 DIAGNOSIS — Z99.81: ICD-10-CM

## 2020-03-04 DIAGNOSIS — Z86.718: ICD-10-CM

## 2020-03-04 DIAGNOSIS — K21.9: Primary | ICD-10-CM

## 2020-03-04 DIAGNOSIS — M54.9: ICD-10-CM

## 2020-03-04 DIAGNOSIS — Z90.49: ICD-10-CM

## 2020-03-04 DIAGNOSIS — J44.9: ICD-10-CM

## 2020-03-04 DIAGNOSIS — Z99.89: ICD-10-CM

## 2020-03-04 DIAGNOSIS — G47.30: ICD-10-CM

## 2020-03-04 DIAGNOSIS — K22.8: ICD-10-CM

## 2020-03-04 DIAGNOSIS — Z91.041: ICD-10-CM

## 2020-03-04 DIAGNOSIS — E66.01: ICD-10-CM

## 2020-03-04 DIAGNOSIS — K29.01: ICD-10-CM

## 2020-03-04 DIAGNOSIS — Z91.030: ICD-10-CM

## 2020-03-04 DIAGNOSIS — K29.51: ICD-10-CM

## 2020-03-04 DIAGNOSIS — Z80.0: ICD-10-CM

## 2020-03-04 DIAGNOSIS — G62.9: ICD-10-CM

## 2020-03-04 DIAGNOSIS — Z79.899: ICD-10-CM

## 2020-03-04 DIAGNOSIS — Z79.891: ICD-10-CM

## 2020-03-04 DIAGNOSIS — Z79.82: ICD-10-CM

## 2020-03-04 DIAGNOSIS — Z88.8: ICD-10-CM

## 2020-03-04 DIAGNOSIS — E78.5: ICD-10-CM

## 2020-03-04 DIAGNOSIS — K29.80: ICD-10-CM

## 2020-03-04 DIAGNOSIS — Z87.891: ICD-10-CM

## 2020-03-04 DIAGNOSIS — Z87.19: ICD-10-CM

## 2020-03-04 DIAGNOSIS — Z98.890: ICD-10-CM

## 2020-03-04 DIAGNOSIS — I10: ICD-10-CM

## 2020-03-04 PROCEDURE — 43249 ESOPH EGD DILATION <30 MM: CPT

## 2020-03-04 PROCEDURE — 43248 EGD GUIDE WIRE INSERTION: CPT

## 2020-03-04 NOTE — P.PCN
Date of Procedure: 03/04/20


Description of Procedure: 














PREOPERATIVE DIAGNOSIS:


Dysphagia.


Epigastric abdominal pain


Gastroesophageal reflux disease


Morbid obesity excess calories, BMI 39.6


Esophageal dysmotility


Presbyesophagus





POSTOPERATIVE DIAGNOSIS:


Dysphagia.


Epigastric abdominal pain


Gastroesophageal reflux disease


Morbid obesity excess calories, BMI 39.6


Esophageal dysmotility with nutcracker esophagus


Presbyesophagus


Diffuse acute and chronic gastritis with bleeding


Duodenitis without bleeding





OPERATION:


Esophagogastroduodenoscopy with rigid dilator over the guidewire 57 Fr.





SURGEON: Yamila Martin MD





ANESTHESIA: MAC.





INDICATIONS:


The patient is a 69-year-old male who presents with a history of dysphagia, bhargavi

re epigastric abdominal pain and esophageal dysmotility. Benefits and risks of 

the procedure were described. Informed consent was obtained.





DESCRIPTION:


The patient was brought into the endoscopy suite and laid in the left lateral 

decubitus position.  After a timeout was confirmed, the procedure was initiated.




An Olympus gastroscope was passed where very tight contractions of the distal 

esophagus was identified.  Moderate acute gastritis with bleeding was 

identified.  The scope was advanced to the duodenum which was remarkable for 

duodenitis.  





Next using an American rigid dilator, a guidewire was placed through the 

gastroscope.  Next the scope was withdrawn.  A 60-Citizen [place] rigid dilator was 

attempted however unable to pass.  A 57-[place]izen [place] rigid American dilator  was 

passed carefully along the posterior oropharynx to 50 cm and left in place for 

2-3 minutes stretch. The dilator was withdrawn including the guidewire.  The 

scope was reentered along the posterior oropharynx with no findings of tear of 

the mucosa or sphincters were found.





No full-thickness injury was encountered.  The GI tract was desufflated.  The 

patient tolerated the procedure well.  





FINDINGS:


Severe distal esophageal contractions consistent with nutcracker esophagus


American rigid dilator 57-[place]izen [place] completed.


Diffuse gastritis, acute or recent bleeding


Hill grade 2 lower esophageal valve.


LA grade B esophagitis.


Duodenitis





RECOMMENDATIONS:


Recommend referral to tertiary care center for symptomatic nutcracker esophagus





Plan - Discharge Summary


Discharge Rx Participant: No


New Discharge Prescriptions: 


No Action


   Spironolactone [Aldactone] 25 mg PO DAILY


   Aspirin EC [Ecotrin Low Dose] 81 mg PO DAILY


   Albuterol Sulfate [Proair Hfa] 1 - 2 puff INHALATION BID


   Albuterol Nebulized [Ventolin Nebulized] 2.5 mg INHALATION QID PRN


     PRN Reason: Dyspnea


   Ondansetron [Zofran] 4 mg PO Q8HR PRN


     PRN Reason: Nausea


   Gabapentin [Neurontin] 300 mg PO TID


   Furosemide [Lasix] 40 mg PO DAILY


   Nf Dilaudid 4.5 mg INTRATHECA CONTINUOUS


   Sennosides [Senna] 8.6 mg PO BID


   Atorvastatin [Lipitor] 10 mg PO HS


Discharge Medication List





Spironolactone [Aldactone] 25 mg PO DAILY 11/23/15 [History]


Aspirin EC [Ecotrin Low Dose] 81 mg PO DAILY 11/16/18 [History]


Albuterol Nebulized [Ventolin Nebulized] 2.5 mg INHALATION QID PRN 10/03/19 

[History]


Albuterol Sulfate [Proair Hfa] 1 - 2 puff INHALATION BID 10/03/19 [History]


Furosemide [Lasix] 40 mg PO DAILY 12/03/19 [History]


Gabapentin [Neurontin] 300 mg PO TID 12/03/19 [History]


Ondansetron [Zofran] 4 mg PO Q8HR PRN 12/03/19 [History]


Nf Dilaudid 4.5 mg INTRATHECA CONTINUOUS 12/04/19 [History]


Atorvastatin [Lipitor] 10 mg PO HS 03/03/20 [History]


Sennosides [Senna] 8.6 mg PO BID 03/03/20 [History]

## 2020-03-04 NOTE — P.GSHP
History of Present Illness


H&P Date: 03/04/20














CHIEF COMPLAINT: GERD





HISTORY OF PRESENT ILLNESS: The patient is a 69-year-old male who


presents reports gastroesophageal reflux disease.  Upper endoscopy was offered 

for further evaluation and management.





PAST MEDICAL HISTORY: 


Please see list.





PAST SURGICAL HISTORY: 


Please see list.





MEDICATIONS: 


Please see list.





ALLERGIES:  Please see list. 





SOCIAL HISTORY: No illicit drug use





FAMILY HISTORY: No reports of Crohn disease or ulcerative colitis. 





REVIEW OF ORGAN SYSTEMS: 


CONSTITUTIONAL: No reports of fevers or chills. 


GI:  Denies any blood in stools or constipation. 





PHYSICAL EXAM: 


VITAL SIGNS:  Stable


GENERAL: Well-developed and pleasant in no acute distress. 


HEENT: No scleral icterus. Extraocular movements grossly


intact. Moist buccal mucosa. 


NECK: Supple without lymphadenopathy. 


CHEST: Unlabored respirations. Equal bilateral excursions. 


CARDIOVASCULAR: Regular rate and rhythm. Distal 2+ pulses. 


ABDOMEN: Soft, nondistended.  


MUSCULOSKELETAL: No clubbing, cyanosis, or edema. 





ASSESSMENT: 


1.  Gastroesophageal reflux disease





PLAN: 


1. Recommend proceeding with an upper endoscopy





Past Medical History


Past Medical History: COPD, Deep Vein Thrombosis (DVT), GERD/Reflux, 

Hyperlipidemia, Hypertension, Pneumonia, Sleep Apnea/CPAP/BIPAP


Additional Past Medical History / Comment(s): Hx. of Diverticulitis, Pneumonia 

(2018),  oxygen @ 5L ATC,  chronic back pain,  uses CPAP, hx. DVT leg , 

neuropathy feet, hx of pancreatitis, hyperglycemia when on steroids-none for 

>year, having stomach pain and dysphagia.


History of Any Multi-Drug Resistant Organisms: None Reported


Past Surgical History: Appendectomy, Bowel Resection, Cholecystectomy


Additional Past Surgical History / Comment(s): Colonoscopy, exp. laparotomy, 

EGD, LASIK EYE SURGERY, pain pump


Past Anesthesia/Blood Transfusion Reactions: No Reported Reaction


Smoking Status: Former smoker





- Past Family History


  ** Brother(s)


Family Medical History: Cancer


Additional Family Medical History / Comment(s): Colon





Medications and Allergies


                                Home Medications











 Medication  Instructions  Recorded  Confirmed  Type


 


Spironolactone [Aldactone] 25 mg PO DAILY 11/23/15 03/03/20 History


 


Aspirin EC [Ecotrin Low Dose] 81 mg PO DAILY 11/16/18 03/03/20 History


 


Albuterol Nebulized [Ventolin 2.5 mg INHALATION QID PRN 10/03/19 03/03/20 

History





Nebulized]    


 


Albuterol Sulfate [Proair Hfa] 1 - 2 puff INHALATION BID 10/03/19 03/03/20 

History


 


Furosemide [Lasix] 40 mg PO DAILY 12/03/19 03/03/20 History


 


Gabapentin [Neurontin] 300 mg PO TID 12/03/19 03/03/20 History


 


Ondansetron [Zofran] 4 mg PO Q8HR PRN 12/03/19 03/03/20 History


 


Nf Dilaudid 4.5 mg INTRATHECA CONTINUOUS 12/04/19 03/03/20 History


 


Atorvastatin [Lipitor] 10 mg PO HS 03/03/20 03/03/20 History


 


Sennosides [Senna] 8.6 mg PO BID 03/03/20 03/03/20 History








                                    Allergies











Allergy/AdvReac Type Severity Reaction Status Date / Time


 


bee pollen Allergy  Anaphylaxis Verified 03/03/20 13:45


 


Iodinated Contrast Media Allergy  Anaphylaxis Verified 03/03/20 13:31





[Iodinated Contrast Media -     





IV Dye]     


 


metformin AdvReac  Abdominal Verified 03/03/20 13:31





   Pain

## 2022-05-27 ENCOUNTER — HOSPITAL ENCOUNTER (OUTPATIENT)
Dept: HOSPITAL 47 - OR | Age: 72
Discharge: HOME | End: 2022-05-27
Attending: SURGERY
Payer: MEDICARE

## 2022-05-27 VITALS — RESPIRATION RATE: 16 BRPM

## 2022-05-27 VITALS — SYSTOLIC BLOOD PRESSURE: 118 MMHG | HEART RATE: 75 BPM | DIASTOLIC BLOOD PRESSURE: 78 MMHG

## 2022-05-27 VITALS — TEMPERATURE: 97 F

## 2022-05-27 DIAGNOSIS — I25.5: ICD-10-CM

## 2022-05-27 DIAGNOSIS — Z88.8: ICD-10-CM

## 2022-05-27 DIAGNOSIS — Z98.890: ICD-10-CM

## 2022-05-27 DIAGNOSIS — K66.0: Primary | ICD-10-CM

## 2022-05-27 DIAGNOSIS — Z96.89: ICD-10-CM

## 2022-05-27 DIAGNOSIS — Z91.030: ICD-10-CM

## 2022-05-27 DIAGNOSIS — Z86.16: ICD-10-CM

## 2022-05-27 DIAGNOSIS — G89.4: ICD-10-CM

## 2022-05-27 DIAGNOSIS — Z79.51: ICD-10-CM

## 2022-05-27 DIAGNOSIS — E66.01: ICD-10-CM

## 2022-05-27 DIAGNOSIS — I11.0: ICD-10-CM

## 2022-05-27 DIAGNOSIS — Z91.048: ICD-10-CM

## 2022-05-27 DIAGNOSIS — Z79.01: ICD-10-CM

## 2022-05-27 DIAGNOSIS — G62.9: ICD-10-CM

## 2022-05-27 DIAGNOSIS — Z82.49: ICD-10-CM

## 2022-05-27 DIAGNOSIS — Z83.3: ICD-10-CM

## 2022-05-27 DIAGNOSIS — J44.9: ICD-10-CM

## 2022-05-27 DIAGNOSIS — Z87.19: ICD-10-CM

## 2022-05-27 DIAGNOSIS — Z87.01: ICD-10-CM

## 2022-05-27 DIAGNOSIS — M54.9: ICD-10-CM

## 2022-05-27 DIAGNOSIS — Z99.81: ICD-10-CM

## 2022-05-27 DIAGNOSIS — Z86.718: ICD-10-CM

## 2022-05-27 DIAGNOSIS — E78.5: ICD-10-CM

## 2022-05-27 DIAGNOSIS — K21.9: ICD-10-CM

## 2022-05-27 DIAGNOSIS — G47.33: ICD-10-CM

## 2022-05-27 DIAGNOSIS — Z87.891: ICD-10-CM

## 2022-05-27 DIAGNOSIS — Z90.49: ICD-10-CM

## 2022-05-27 DIAGNOSIS — Z79.899: ICD-10-CM

## 2022-05-27 LAB
ALBUMIN SERPL-MCNC: 4 G/DL (ref 3.5–5)
ALP SERPL-CCNC: 68 U/L (ref 38–126)
ALT SERPL-CCNC: 14 U/L (ref 4–49)
ANION GAP SERPL CALC-SCNC: 7 MMOL/L
AST SERPL-CCNC: 23 U/L (ref 17–59)
BASOPHILS # BLD AUTO: 0.1 K/UL (ref 0–0.2)
BASOPHILS NFR BLD AUTO: 1 %
BUN SERPL-SCNC: 14 MG/DL (ref 9–20)
CALCIUM SPEC-MCNC: 8.3 MG/DL (ref 8.4–10.2)
CHLORIDE SERPL-SCNC: 98 MMOL/L (ref 98–107)
CO2 SERPL-SCNC: 34 MMOL/L (ref 22–30)
EOSINOPHIL # BLD AUTO: 0.2 K/UL (ref 0–0.7)
EOSINOPHIL NFR BLD AUTO: 2 %
ERYTHROCYTE [DISTWIDTH] IN BLOOD BY AUTOMATED COUNT: 4.27 M/UL (ref 4.3–5.9)
ERYTHROCYTE [DISTWIDTH] IN BLOOD: 13.2 % (ref 11.5–15.5)
GLUCOSE BLD-MCNC: 140 MG/DL (ref 75–99)
GLUCOSE SERPL-MCNC: 141 MG/DL (ref 74–99)
HCT VFR BLD AUTO: 41.5 % (ref 39–53)
HGB BLD-MCNC: 13.5 GM/DL (ref 13–17.5)
LYMPHOCYTES # SPEC AUTO: 3.2 K/UL (ref 1–4.8)
LYMPHOCYTES NFR SPEC AUTO: 36 %
MCH RBC QN AUTO: 31.6 PG (ref 25–35)
MCHC RBC AUTO-ENTMCNC: 32.4 G/DL (ref 31–37)
MCV RBC AUTO: 97.4 FL (ref 80–100)
MONOCYTES # BLD AUTO: 0.7 K/UL (ref 0–1)
MONOCYTES NFR BLD AUTO: 8 %
NEUTROPHILS # BLD AUTO: 4.4 K/UL (ref 1.3–7.7)
NEUTROPHILS NFR BLD AUTO: 50 %
PLATELET # BLD AUTO: 310 K/UL (ref 150–450)
POTASSIUM SERPL-SCNC: 3.4 MMOL/L (ref 3.5–5.1)
PROT SERPL-MCNC: 6.9 G/DL (ref 6.3–8.2)
SODIUM SERPL-SCNC: 139 MMOL/L (ref 137–145)
WBC # BLD AUTO: 8.9 K/UL (ref 3.8–10.6)

## 2022-05-27 PROCEDURE — 80053 COMPREHEN METABOLIC PANEL: CPT

## 2022-05-27 PROCEDURE — 64488 TAP BLOCK BI INJECTION: CPT

## 2022-05-27 PROCEDURE — 85025 COMPLETE CBC W/AUTO DIFF WBC: CPT

## 2022-05-27 PROCEDURE — 44180 LAP ENTEROLYSIS: CPT

## 2022-05-27 NOTE — P.OP
Date of Procedure: 05/27/22


Description of Procedure: 








SURGEON:  YAMILA MARTIN MD


PREOPERATIVE DIAGNOSES:  


1.  History of multiple abdominal surgeries including peritoneal adhesions


2.  Chronic pain syndrome


3.  Status post pain pump insertion, right lower quadrant


4.  Morbid obesity due to excess calories, BMI 38.6


5.  Congestive heart failure


6.  Ischemic cardiomyopathy


7.  Chronic anticoagulant use


8.  Chronic obstructive pulmonary disease with dependence on oxygen


9.  Neuropathy





POSTOPERATIVE DIAGNOSES:  


1.  History of multiple abdominal surgeries including peritoneal adhesions


2.  Chronic pain syndrome


3.  Status post pain pump insertion, right lower quadrant


4.  Morbid obesity due to excess calories, BMI 38.6


5.  Congestive heart failure


6.  Ischemic cardiomyopathy


7.  Chronic anticoagulant use


8.  Chronic obstructive pulmonary disease with dependence on oxygen


9.  Neuropathy


10.  Severe pelvic adhesions including intraloop adhesions of the pelvis.





OPERATION:       


1. Robotic-assisted da Samantha Xi laparoscopic extensive lysis of adhesions over 

1.5 hr





COMPLICATIONS:  None.


Anesthesia: GETA, local, abdominal wall block


Estimated Blood Loss (ml): 5


Pathology: none sent


Condition: stable


Disposition: same day





OPERATIVE FINDINGS:  


1.  Severe pelvic adhesions involving small bowel and interloop adhesions


2.  Right upper quadrant right lower quadrant abdominal pain consistent with 

severe adhesive disease involving terminal ileum adherent to the right pelvis 

lysed


3.  Adhesions along liver bed from cholecystectomy site lysed


4.  Adhesions involving right lateral abdominal wall and hepatic flexure lysed


5.  Deep intra-abdominal adhesions involving the mid jejunum adherent to the 

spine unable to lyse


6.  Small bowel viable





INDICATIONS: The patient is a 71-year-old male who with severe right upper 

quadrant right lower quadrant abdominal pain despite pain pump and history of 

chronic pain syndrome.  Surgical intervention was described given his history of

severe adhesive band disease.  Informed consent was obtained.  Robotic assisted 

laparoscopic approach was described. Benefits and risks of the procedure 

including but not limited to bleeding, infection, injury to the small bowel was 

described. Informed consent was obtained.  





DESCRIPTION OF PROCEDURE: Patient was brought to the operating room, placed in 

supine position. After general induction, the abdomen had been prepped and 

draped in standard sterile fashion. The robotic da Samantha XI system was primed. 





After a timeout protocol was performed, the patient had been prepped and draped 

in standard sterile fashion.





The robot was docked along the right lateral abdomen. The patient was 

repositioned in Trendelenburg position of 7- degrees. A 5 mm 0 degrees 

laparoscopic trocar entry was performed along the left upper quadrant.  The 

abdomen was insufflated to 15 mmHg pressure which he tolerated well.  Diagnostic

laparoscopy demonstrated severe intra-abdominal adhesions involving the lower 

midline. The small bowel was unremarkable without evidence of dilation or 

suggestion of obstruction.  No injury to the bowel, viscera or mesentery was 

identified.





Next, three 8 mm robotic ports were placed along the left lateral abdominal 

wall. Please note that the ports were placed at least 10 cm away from the target

anatomy. 





Instruments were interchanged using only 3 ports for a grasper, vessel sealer.  

Instruments were interchanged by the assistant including Bovie cautery scissors.

I had sat at the console.  





Extensive lysis of adhesions over 1 hour was performed.  Carefully the adhesions

were taken down to address the lower midline to pelvis.  Along the right upper 

quadrant, adhesions of the deflected flexure to the abdominal wall was lysed.  

Separately the liver bed of omentum was lysed consistent patient's pain.  The 

small bowel was investigated where the terminal ileum was caught and densely 

adherent to the right pelvis.  Careful lysis of adhesions was performed to 

release the small intestine without enterotomy.  Multiple intraloop adhesions 

were carefully lysed without injury.  At the mid jejunum, an adhesive band and 

cystic adherent to the retroperitoneum was unable to lyse due to its location.





The robot was undocked.





All pneumoperitoneum and instruments were evacuated from the abdominal cavity. 

The incisions were reapproximated using 4-0 Monocryl in an interrupted 

subcuticular fashion. Please note along the trocar sites, local anesthetic was 

placed as a field block prior to insertion of all instruments.  





Liquid glue was applied to the skin.





At the end of the procedure needle, sponge, and instrument count had been 

verified correct by the surgical technician. The patient was transferred to 

postanesthesia care unit in stable condition.





Intraoperative images including findings were described to the patients family.








Plan - Discharge Summary


Discharge Rx Participant: No


New Discharge Prescriptions: 


New


   Simethicone [Gas-X] 125 mg PO AC-TID PRN #20 capsule


     PRN Reason: Pain


   Acetaminophen Tab [Tylenol Tab] 500 mg PO Q6H PRN #30 tablet


     PRN Reason: Pain





Continue


   Spironolactone [Aldactone] 25 mg PO QAM


   Gabapentin [Neurontin] 100 mg PO TID


   Furosemide [Lasix] 40 mg PO DAILY


   Nf Dilaudid 4.5 mg INTRATHECA CONTINUOUS


   tiZANidine HCL 4 mg PO BID PRN


     PRN Reason: Pain


   lisinopriL [Zestril] 5 mg PO QAM


   Omeprazole 40 mg PO QAM


   Umeclidinium Bromide [Incruse Ellipta] 1 puff IN QAM


   Apixaban [Eliquis] 5 mg PO BID


   oxyCODONE-APAP 10-325MG [Percocet  mg] 1 tab PO Q8H


   Isosorbide Mononitrate [Isosorbide Mononitrate ER] 30 mg PO QAM


   Fluticasone/Salmeterol [Advair 250-50 Diskus] 2 puff INHALATION BID PRN


     PRN Reason: Shortness Of Breath Or Wheezing


Discharge Medication List





Spironolactone [Aldactone] 25 mg PO QAM 11/23/15 [History]


Furosemide [Lasix] 40 mg PO DAILY 12/03/19 [History]


Gabapentin [Neurontin] 100 mg PO TID 12/03/19 [History]


Nf Dilaudid 4.5 mg INTRATHECA CONTINUOUS 12/04/19 [History]


Apixaban [Eliquis] 5 mg PO BID 05/25/22 [History]


Fluticasone/Salmeterol [Advair 250-50 Diskus] 2 puff INHALATION BID PRN 05/25/22

[History]


Isosorbide Mononitrate [Isosorbide Mononitrate ER] 30 mg PO QAM 05/25/22 [Hist

ory]


Omeprazole 40 mg PO QAM 05/25/22 [History]


Umeclidinium Bromide [Incruse Ellipta] 1 puff IN QAM 05/25/22 [History]


lisinopriL [Zestril] 5 mg PO QAM 05/25/22 [History]


oxyCODONE-APAP 10-325MG [Percocet  mg] 1 tab PO Q8H 05/25/22 [History]


tiZANidine HCL 4 mg PO BID PRN 05/25/22 [History]


Acetaminophen Tab [Tylenol Tab] 500 mg PO Q6H PRN #30 tablet 05/27/22 [Rx]


Simethicone [Gas-X] 125 mg PO AC-TID PRN #20 capsule 05/27/22 [Rx]








Follow up Appointment(s)/Referral(s): 


Yamila Martin MD [STAFF PHYSICIAN] - 05/31/22 (Telehealth)


Patient Instructions/Handouts:  Lysis of Abdominal Adhesions (DC), *[facility] 

- Managing Your Pain After Surgery Without Opioids


Activity/Diet/Wound Care/Special Instructions: 





START BLOOD THINNER 5/31/22. Liquid diet today





PLEASE NOTIFY PAIN SPECIALIST FOR MORE NARCOTICS IF NEEDED





No lifting over 10 pounds in 2 weeks until  Dayanna 10.  





May shower. No bath tub soaks for two weeks until  Dayanna 10.





Diet as tolerated. 





Use Tylenol, simethicone scheduled for the next 24-48 hours for best pain 

relief.





Use ice along incisions for today to prevent swelling.


Discharge Disposition: HOME SELF-CARE

## 2022-05-27 NOTE — P.ANPRN
Procedure Note - Anesthesia





- Nerve Block Performed


  ** Bilateral Erector Spinae Single


Time Out Performed: Yes


Date of Procedure: 05/27/22


Procedure Start Time: 07:31


Procedure Stop Time: 07:41


Location of Patient: PreOp


Indication: Acute Post-Operative Pain, Requested by Surgeon


Sedation Type: Sedate with meaningful contact maintained


Preparation: Sterile Prep, Sterile Dressing


Position: Prone


Catheter: None


Needle Types: Facet


Needle Gauge: 20


Ultrasound used to visualize needle placement: Yes


Ultrasound used to observe medication spread: Yes


Injectate: Other (see comment) (Ropivacaine 0.25% 30 ml + decadron 4 mg per 

side)


Blood Aspirated: No


Pain Paresthesia on Injection Noted: No


Resistance on Injection: Normal


Image Stored and Saved: Yes


Events: Uneventful and Well Tolerated

## 2022-05-27 NOTE — P.GSHP
History of Present Illness


H&P Date: 05/27/22

















CHIEF COMPLAINT: History of intra-abdominal adhesions





HISTORY OF PRESENT ILLNESS: The patient is a 71-year-old male who


presents with history of intra-abdominal adhesions from multiple prior surgeries

including increasing abdominal pain.


He presents for diagnostic laparoscopy including lysis of adhesions.





PAST MEDICAL HISTORY: 


Please see list.





PAST SURGICAL HISTORY: 


Please see list.





MEDICATIONS: 


Please see list.





ALLERGIES:  Please see list. 





SOCIAL HISTORY: No illicit drug use





FAMILY HISTORY: No reports of Crohn disease or ulcerative colitis. 





REVIEW OF ORGAN SYSTEMS: 


CONSTITUTIONAL: No reports of fevers or chills. 


GI:  Denies any blood in stools or constipation. 





PHYSICAL EXAM: 


VITAL SIGNS:  Stable


GENERAL: Well-developed pleasant and in no acute distress. 


HEENT: No scleral icterus. Extraocular movements grossly


intact. Moist buccal mucosa. 


NECK: Supple without lymphadenopathy. 


CHEST: Unlabored respirations. Equal bilateral excursions. 


CARDIOVASCULAR: Regular rate and rhythm. Distal 2+ pulses. 


ABDOMEN: Soft, diffuse abdominal tenderness.  No peritonitis.


MUSCULOSKELETAL: No clubbing, cyanosis, or edema. 





ASSESSMENT: 


1.  Diffuse abdominal pain.


2.  History of multiple abdominal surgeries.


3.  Intra-abdominal adhesions.





PLAN: 


1.  Robotic lysis of adhesions were described in detail including risk of injury

to the intestine, need for further surgery, and open technique.


2.  DVT prophylaxis.


3.  Antibiotic prophylaxis.





Past Medical History


Past Medical History: COPD, Deep Vein Thrombosis (DVT), GERD/Reflux, 

Hyperlipidemia, Hypertension, Pneumonia, Sleep Apnea/CPAP/BIPAP


Additional Past Medical History / Comment(s): COVID IN NOVEMBER 2021.  CHRONIC 

ABD PAIN HAS NOT FOUND DEFINITIVE CAUSE.  Hx. of Diverticulitis, Pneumonia 

(2018),  oxygen @ 4L ATC,  chronic back pain,  uses CPAP, hx. DVT leg , 

neuropathy feet, hx of pancreatitis.


History of Any Multi-Drug Resistant Organisms: None Reported


Past Surgical History: Appendectomy, Bowel Resection, Cholecystectomy


Additional Past Surgical History / Comment(s): BOWEL RESECTION (NEVER HAD TEMP 

COLOSTOMY.  Colonoscopy, exp. laparotomy, EGD, LASIK EYE SURGERY, pain pump


Past Anesthesia/Blood Transfusion Reactions: No Reported Reaction


Past Psychological History: No Psychological Hx Reported


Smoking Status: Former smoker


Past Alcohol Use History: Rare


Additional Past Alcohol Use History / Comment(s): quit smoking 2001, smoked 

1-2ppd since age of 18


Past Drug Use History: None Reported





- Past Family History


  ** Brother(s)


Family Medical History: Cancer


Additional Family Medical History / Comment(s): Colon





Medications and Allergies


                                Home Medications











 Medication  Instructions  Recorded  Confirmed  Type


 


Spironolactone [Aldactone] 25 mg PO QAM 11/23/15 05/27/22 History


 


Furosemide [Lasix] 40 mg PO DAILY 12/03/19 05/27/22 History


 


Gabapentin [Neurontin] 100 mg PO TID 12/03/19 05/27/22 History


 


Nf Dilaudid 4.5 mg INTRATHECA CONTINUOUS 12/04/19 05/27/22 History


 


Apixaban [Eliquis] 5 mg PO BID 05/25/22 05/27/22 History


 


Fluticasone/Salmeterol [Advair 2 puff INHALATION BID PRN 05/25/22 05/27/22 

History





250-50 Diskus]    


 


Isosorbide Mononitrate [Isosorbide 30 mg PO QAM 05/25/22 05/27/22 History





Mononitrate ER]    


 


Omeprazole 40 mg PO QAM 05/25/22 05/27/22 History


 


Umeclidinium Bromide [Incruse 1 puff IN QAM 05/25/22 05/27/22 History





Ellipta]    


 


lisinopriL [Zestril] 5 mg PO QAM 05/25/22 05/27/22 History


 


oxyCODONE-APAP 10-325MG [Percocet 1 tab PO Q8H 05/25/22 05/27/22 History





 mg]    


 


tiZANidine HCL 4 mg PO BID PRN 05/25/22 05/27/22 History








                                    Allergies











Allergy/AdvReac Type Severity Reaction Status Date / Time


 


bee pollen Allergy  Anaphylaxis Verified 05/27/22 06:58


 


Iodinated Contrast Media Allergy  Anaphylaxis Verified 05/27/22 06:58





[Iodinated Contrast Media -     





IV Dye]     


 


metformin AdvReac  Abdominal Verified 05/27/22 06:58





   Pain

## 2023-04-20 ENCOUNTER — HOSPITAL ENCOUNTER (OUTPATIENT)
Dept: HOSPITAL 47 - EC | Age: 73
Setting detail: OBSERVATION
LOS: 4 days | Discharge: HOME | End: 2023-04-24
Attending: INTERNAL MEDICINE | Admitting: INTERNAL MEDICINE
Payer: MEDICARE

## 2023-04-20 DIAGNOSIS — E78.5: ICD-10-CM

## 2023-04-20 DIAGNOSIS — Z88.8: ICD-10-CM

## 2023-04-20 DIAGNOSIS — E66.9: ICD-10-CM

## 2023-04-20 DIAGNOSIS — K21.9: ICD-10-CM

## 2023-04-20 DIAGNOSIS — E11.40: ICD-10-CM

## 2023-04-20 DIAGNOSIS — Z87.891: ICD-10-CM

## 2023-04-20 DIAGNOSIS — I50.9: ICD-10-CM

## 2023-04-20 DIAGNOSIS — Z86.16: ICD-10-CM

## 2023-04-20 DIAGNOSIS — G47.33: ICD-10-CM

## 2023-04-20 DIAGNOSIS — G89.4: ICD-10-CM

## 2023-04-20 DIAGNOSIS — J98.11: ICD-10-CM

## 2023-04-20 DIAGNOSIS — K22.4: ICD-10-CM

## 2023-04-20 DIAGNOSIS — E11.65: ICD-10-CM

## 2023-04-20 DIAGNOSIS — K22.2: ICD-10-CM

## 2023-04-20 DIAGNOSIS — Z79.899: ICD-10-CM

## 2023-04-20 DIAGNOSIS — Z98.42: ICD-10-CM

## 2023-04-20 DIAGNOSIS — Z90.49: ICD-10-CM

## 2023-04-20 DIAGNOSIS — Z91.041: ICD-10-CM

## 2023-04-20 DIAGNOSIS — Z87.01: ICD-10-CM

## 2023-04-20 DIAGNOSIS — Z98.41: ICD-10-CM

## 2023-04-20 DIAGNOSIS — Z20.822: ICD-10-CM

## 2023-04-20 DIAGNOSIS — I27.20: ICD-10-CM

## 2023-04-20 DIAGNOSIS — Z86.718: ICD-10-CM

## 2023-04-20 DIAGNOSIS — K29.50: Primary | ICD-10-CM

## 2023-04-20 DIAGNOSIS — I11.0: ICD-10-CM

## 2023-04-20 DIAGNOSIS — I25.10: ICD-10-CM

## 2023-04-20 DIAGNOSIS — Z80.0: ICD-10-CM

## 2023-04-20 DIAGNOSIS — J96.11: ICD-10-CM

## 2023-04-20 DIAGNOSIS — K29.00: ICD-10-CM

## 2023-04-20 DIAGNOSIS — J43.9: ICD-10-CM

## 2023-04-20 DIAGNOSIS — Z99.81: ICD-10-CM

## 2023-04-20 LAB
ALBUMIN SERPL-MCNC: 3.7 G/DL (ref 3.5–5)
ALP SERPL-CCNC: 72 U/L (ref 38–126)
ALT SERPL-CCNC: 19 U/L (ref 4–49)
ANION GAP SERPL CALC-SCNC: 7 MMOL/L
APTT BLD: 24.2 SEC (ref 22–30)
AST SERPL-CCNC: 23 U/L (ref 17–59)
BASOPHILS # BLD AUTO: 0 K/UL (ref 0–0.2)
BASOPHILS NFR BLD AUTO: 0 %
BUN SERPL-SCNC: 17 MG/DL (ref 9–20)
CALCIUM SPEC-MCNC: 8.6 MG/DL (ref 8.4–10.2)
CHLORIDE SERPL-SCNC: 100 MMOL/L (ref 98–107)
CO2 SERPL-SCNC: 29 MMOL/L (ref 22–30)
EOSINOPHIL # BLD AUTO: 0.2 K/UL (ref 0–0.7)
EOSINOPHIL NFR BLD AUTO: 3 %
ERYTHROCYTE [DISTWIDTH] IN BLOOD BY AUTOMATED COUNT: 4.56 M/UL (ref 4.3–5.9)
ERYTHROCYTE [DISTWIDTH] IN BLOOD: 13 % (ref 11.5–15.5)
GLUCOSE BLD-MCNC: 187 MG/DL (ref 70–110)
GLUCOSE SERPL-MCNC: 263 MG/DL (ref 74–99)
HCT VFR BLD AUTO: 42.6 % (ref 39–53)
HGB BLD-MCNC: 14.2 GM/DL (ref 13–17.5)
INR PPP: 1 (ref ?–1.2)
LYMPHOCYTES # SPEC AUTO: 2 K/UL (ref 1–4.8)
LYMPHOCYTES NFR SPEC AUTO: 29 %
MAGNESIUM SPEC-SCNC: 1.7 MG/DL (ref 1.6–2.3)
MCH RBC QN AUTO: 31.2 PG (ref 25–35)
MCHC RBC AUTO-ENTMCNC: 33.4 G/DL (ref 31–37)
MCV RBC AUTO: 93.4 FL (ref 80–100)
MONOCYTES # BLD AUTO: 0.4 K/UL (ref 0–1)
MONOCYTES NFR BLD AUTO: 5 %
NEUTROPHILS # BLD AUTO: 4.3 K/UL (ref 1.3–7.7)
NEUTROPHILS NFR BLD AUTO: 61 %
PLATELET # BLD AUTO: 208 K/UL (ref 150–450)
POTASSIUM SERPL-SCNC: 4.4 MMOL/L (ref 3.5–5.1)
PROT SERPL-MCNC: 6.6 G/DL (ref 6.3–8.2)
PT BLD: 10.8 SEC (ref 9–12)
SODIUM SERPL-SCNC: 136 MMOL/L (ref 137–145)
WBC # BLD AUTO: 7 K/UL (ref 3.8–10.6)

## 2023-04-20 PROCEDURE — 85610 PROTHROMBIN TIME: CPT

## 2023-04-20 PROCEDURE — 85025 COMPLETE CBC W/AUTO DIFF WBC: CPT

## 2023-04-20 PROCEDURE — 94640 AIRWAY INHALATION TREATMENT: CPT

## 2023-04-20 PROCEDURE — 80061 LIPID PANEL: CPT

## 2023-04-20 PROCEDURE — 93005 ELECTROCARDIOGRAM TRACING: CPT

## 2023-04-20 PROCEDURE — 87636 SARSCOV2 & INF A&B AMP PRB: CPT

## 2023-04-20 PROCEDURE — 92526 ORAL FUNCTION THERAPY: CPT

## 2023-04-20 PROCEDURE — 94760 N-INVAS EAR/PLS OXIMETRY 1: CPT

## 2023-04-20 PROCEDURE — 96372 THER/PROPH/DIAG INJ SC/IM: CPT

## 2023-04-20 PROCEDURE — 92611 MOTION FLUOROSCOPY/SWALLOW: CPT

## 2023-04-20 PROCEDURE — 36415 COLL VENOUS BLD VENIPUNCTURE: CPT

## 2023-04-20 PROCEDURE — 85730 THROMBOPLASTIN TIME PARTIAL: CPT

## 2023-04-20 PROCEDURE — 83605 ASSAY OF LACTIC ACID: CPT

## 2023-04-20 PROCEDURE — 83880 ASSAY OF NATRIURETIC PEPTIDE: CPT

## 2023-04-20 PROCEDURE — 74230 X-RAY XM SWLNG FUNCJ C+: CPT

## 2023-04-20 PROCEDURE — 80053 COMPREHEN METABOLIC PANEL: CPT

## 2023-04-20 PROCEDURE — 43249 ESOPH EGD DILATION <30 MM: CPT

## 2023-04-20 PROCEDURE — 71046 X-RAY EXAM CHEST 2 VIEWS: CPT

## 2023-04-20 PROCEDURE — 83036 HEMOGLOBIN GLYCOSYLATED A1C: CPT

## 2023-04-20 PROCEDURE — 99285 EMERGENCY DEPT VISIT HI MDM: CPT

## 2023-04-20 PROCEDURE — 84484 ASSAY OF TROPONIN QUANT: CPT

## 2023-04-20 PROCEDURE — 83735 ASSAY OF MAGNESIUM: CPT

## 2023-04-20 PROCEDURE — 88305 TISSUE EXAM BY PATHOLOGIST: CPT

## 2023-04-20 PROCEDURE — 43239 EGD BIOPSY SINGLE/MULTIPLE: CPT

## 2023-04-20 RX ADMIN — NITROGLYCERIN SCH INCH: 20 OINTMENT TOPICAL at 18:02

## 2023-04-20 RX ADMIN — HEPARIN SODIUM SCH UNIT: 5000 INJECTION INTRAVENOUS; SUBCUTANEOUS at 18:04

## 2023-04-20 RX ADMIN — DICYCLOMINE HYDROCHLORIDE SCH MG: 20 TABLET ORAL at 22:02

## 2023-04-20 RX ADMIN — OXYCODONE HYDROCHLORIDE AND ACETAMINOPHEN SCH EACH: 10; 325 TABLET ORAL at 22:00

## 2023-04-20 RX ADMIN — IPRATROPIUM BROMIDE SCH MG: 0.5 SOLUTION RESPIRATORY (INHALATION) at 18:49

## 2023-04-20 RX ADMIN — DICYCLOMINE HYDROCHLORIDE SCH MG: 20 TABLET ORAL at 18:04

## 2023-04-20 NOTE — XR
EXAMINATION TYPE: XR chest 2V

 

DATE OF EXAM: 4/20/2023 2:13 PM

 

COMPARISON: Chest radiographs from chest radiograph 1/16/2023

 

TECHNIQUE: XR chest 2V Frontal and lateral views of the chest.

 

CLINICAL INDICATION:Male, 72 years old with history of difficulty breathing; 

 

FINDINGS: 

Lungs/Pleura: No pleural effusion or pneumothorax. Basilar patchy airspace opacities are again identi
fied and have not significantly changed from prior examination. Background emphysematous changes.

Pulmonary vascularity: Unremarkable.

Heart/mediastinum: Cardiomediastinal silhouette is unremarkable.

Musculoskeletal: Multiple level degenerative disc disease changes seen throughout the spine.

 

IMPRESSION: 

Background COPD changes with similar bibasilar patchy airspace opacities. This could represent pneumo
wandy versus pulmonary fibrotic changes.

## 2023-04-20 NOTE — ED
General Adult HPI





- General


Chief complaint: Shortness of Breath


Stated complaint: JORDI


Time Seen by Provider: 04/20/23 13:35


Source: patient, family, RN notes reviewed, old records reviewed


Mode of arrival: wheelchair


Limitations: no limitations





- History of Present Illness


Initial comments: 





This is a 72-year-old male who presents to the emergency department with past 

medical history significant for COPD diabetes hypertension or stroke.  Patient 

comes in stating that he recently had a barium swallow 16 days ago and since 

then he believes that he may have swallowed some barium into his lungs.  Patient

states is not coughing or having any fever but he is becoming more more short of

breath particularly with exertion.  Patient states she's also had quite a bit of

chest heaviness like someone sitting on his chest for the last week.  Patient 

denies any palpitations.  Patient denies any diaphoretic episodes.  Patient 

states he has had no increased swelling to the legs or calf tenderness but he 

does state that he's had a history of congestive heart failure.





- Related Data


                                Home Medications











 Medication  Instructions  Recorded  Confirmed


 


Spironolactone [Aldactone] 25 mg PO DAILY 11/23/15 04/20/23


 


Furosemide [Lasix] 40 mg PO DAILY 12/03/19 04/20/23


 


Omeprazole 40 mg PO DAILY 05/25/22 04/20/23


 


lisinopriL [Zestril] 5 mg PO DAILY 05/25/22 04/20/23


 


oxyCODONE-APAP 10-325MG [Percocet 1 tab PO Q8H 05/25/22 04/20/23





 mg]   


 


tiZANidine HCL 4 mg PO BID PRN 05/25/22 04/20/23


 


Albuterol Sulfate [Albuterol 1 puff PO RT-BID 04/20/23 04/20/23





Sulfate Hfa]   


 


Atorvastatin [Lipitor] 20 mg PO HS 04/20/23 04/20/23


 


Dextroamphetamine/Amphetamine 30 mg PO BID@0900,1600 04/20/23 04/20/23





[Adderall]   


 


Dicyclomine [Bentyl] 20 mg PO QID 04/20/23 04/20/23


 


Ipratropium Nebulized [Atrovent 0.5 mg INHALATION RT-QID 04/20/23 04/20/23





Nebulized 0.2 MG/ML]   


 


Isosorbide Dinitrate 30 mg PO DAILY 04/20/23 04/20/23











                                    Allergies











Allergy/AdvReac Type Severity Reaction Status Date / Time


 


bee pollen Allergy  Anaphylaxis Verified 04/20/23 15:21


 


Iodinated Contrast Media Allergy  Anaphylaxis Verified 04/20/23 15:21





[Iodinated Contrast Media -     





IV Dye]     


 


metformin AdvReac  Abdominal Verified 04/20/23 15:21





   Pain  














Review of Systems


ROS Statement: 


Those systems with pertinent positive or pertinent negative responses have been 

documented in the HPI.





ROS Other: All systems not noted in ROS Statement are negative.





Past Medical History


Past Medical History: COPD, Deep Vein Thrombosis (DVT), GERD/Reflux, 

Hyperlipidemia, Hypertension, Pneumonia, Sleep Apnea/CPAP/BIPAP


Additional Past Medical History / Comment(s): COVID IN NOVEMBER 2021.  CHRONIC 

ABD PAIN HAS NOT FOUND DEFINITIVE CAUSE.  Hx. of Diverticulitis, Pneumonia (2018

),  oxygen @ 4L ATC,  chronic back pain,  uses CPAP, hx. DVT leg , neuropathy 

feet, hx of pancreatitis.


History of Any Multi-Drug Resistant Organisms: None Reported


Past Surgical History: Appendectomy, Bowel Resection, Cholecystectomy


Additional Past Surgical History / Comment(s): BOWEL RESECTION (NEVER HAD TEMP 

COLOSTOMY.  Colonoscopy, exp. laparotomy, EGD, LASIK EYE SURGERY, pain pump


Past Anesthesia/Blood Transfusion Reactions: No Reported Reaction


Past Psychological History: No Psychological Hx Reported


Smoking Status: Former smoker


Past Alcohol Use History: Rare


Past Drug Use History: None Reported





- Past Family History


  ** Brother(s)


Family Medical History: Cancer


Additional Family Medical History / Comment(s): Colon





General Exam





- General Exam Comments


Initial Comments: 





GENERAL:


Patient is well-developed and well-nourished.  Patient is nontoxic and well-

hydrated and is in mild distress.





ENT:


Neck is soft and supple.  No significant lymphadenopathy is noted.  Oropharynx 

is clear.  Moist mucous membranes.  Neck has full range of motion without 

eliciting any pain. 





EYES:


The sclera were anicteric and conjunctiva were pink and moist.  Extraocular 

movements were intact and pupils were equal round and reactive to light.  

Eyelids were unremarkable.





PULMONARY:


Unlabored respirations.  Good breath sounds bilaterally.  No audible rales 

rhonchi or wheezing was noted.





CARDIOVASCULAR:


There is a regular rate and rhythm without any murmurs gallops or rubs.  





ABDOMEN:


Soft and nontender with normal bowel sounds. 





SKIN:


Skin is clear with no lesions or rashes and otherwise unremarkable.





NEUROLOGIC:


Patient is alert and oriented x3.  Cranial nerves II through XII are grossly 

intact.  Motor and sensory are also intact.  Normal speech, volume and content. 

Symmetrical smile.  





MUSCULOSKELETAL:


Normal extremities with adequate strength and full range of motion.  





LYMPHATICS:


No significant lymphadenopathy is noted





PSYCHIATRIC:


Normal psychiatric evaluation.  


Limitations: no limitations





Course


                                   Vital Signs











  04/20/23 04/20/23





  13:31 15:29


 


Temperature 97.9 F 


 


Pulse Rate 91 92


 


Respiratory 20 18





Rate  


 


Blood Pressure 111/72 102/63


 


O2 Sat by Pulse 93 L 98





Oximetry  














Medical Decision Making





- Medical Decision Making





EKG was interpreted by myself shows a sinus rhythm at 90 bpm IA interval is 130 

QRS is 114 QT interval is 368 QTC is 4:15.  Patient's EKG shows no ST segment 

elevation or depression





Was pt. sent in by a medical professional or institution (, PA, NP, urgent 

care, hospital, or nursing home...) When possible be specific


@  -No


Did you speak to anyone other than the patient for history (EMS, parent, family,

police, friend...)? What history was obtained from this source 


@  -No


Did you review nursing and triage notes (agree or disagree)?  Why? 


@  -I reviewed and agree with nursing and triage notes


Were old charts reviewed (outside hosp., previous admission, EMS record, old 

EKG, old radiological studies, urgent care reports/EKG's, nursing home records)?

Report findings 


@  -Prior lab work was reviewed as were charts


Differential Diagnosis (chest pain, altered mental status, abdominal pain women,

abdominal pain men, vaginal bleeding, weakness, fever, dyspnea, syncope, 

headache, dizziness, GI bleed, back pain, seizure, CVA, palpatations, mental 

health, musculoskeletal)? 


@  -Differential Chest Pain:


Stable Angina, Unstable Angina, STEMI, NSTEMI Aortic Dissection, Pneumothorax, 

Musculoskeletal, Esophageal Spasm GERD, Cholecystitis, Pancreatitis, Zoster, 

this is not meant to be an all-inclusive list. 





EKG interpreted by me (3pts min.).


@  -As above


X-rays interpreted by me (1pt min.).


@  -Chest x-ray was interpreted by myself that shows no acute abnormality


CT interpreted by me (1pt min.).


@  -None done


U/S interpreted by me (1pt. min.).


@  -None done


What testing was considered but not performed or refused? (CT, X-rays, U/S, 

labs)? Why?


@  -None


What meds were considered but not given or refused? Why?


@  -None


Did you discuss the management of the patient with other professionals 

(professionals i.e. , PA, NP, lab, RT, psych nurse, , , 

teacher, , )? Give summary


@  -I spoke with Dr. Hayward he agreed to admit the patient


Was smoking cessation discussed for >3mins.?


@  -No


Was critical care preformed (if so, how long)?


@  -No


Were there social determinants of health that impacted care today? How? 

(Homelessness, low income, unemployed, alcoholism, drug addiction, 

transportation, low edu. Level, literacy, decrease access to med. care, nursing home, 

rehab)?


@  -No


Was there de-escalation of care discussed even if they declined (Discuss DNR or 

withdrawal of care, Hospice)? DNR status


@  -No


What co-morbidities impacted this encounter? (DM, HTN, Smoking, COPD, CAD, C

ancer, CVA, ARF, Chemo, Hep., AIDS, mental health diagnosis, sleep apnea, morbid

obesity)?


@  -None


Was patient admitted / discharged? Hospital course, mention meds given and 

route, prescriptions, significant lab abnormalities, going to OR and other 

pertinent info.


@  -Patient had chest x-ray showed no acute abnormality.  Patient had no white 

count.  Patient was in no respiratory distress.  Patient continued to have 

intermittent chest pain.  I wrote admitting orders I admitted the patient and I 

consult cardiology


Undiagnosed new problem with uncertain prognosis?


@  -No


Drug Therapy requiring intensive monitoring for toxicity (Heparin, Nitro, 

Insulin, Cardizem)?


@  -No


Were any procedures done?


@  -No


Diagnosis/symptom?


@  -Chest pain


Acute, or Chronic, or Acute on Chronic?


@  -Acute


Uncomplicated (without systemic symptoms) or Complicated (systemic symptoms)?


@  -Complicated


Side effects of treatment?


@  -No


Exacerbation, Progression, or Severe Exacerbation?


@  -No


Poses a threat to life or bodily function? How? (Chest pain, USA, MI, pneumonia,

PE, COPD, DKA, ARF, appy, cholecystitis, CVA, Diverticulitis, Homicidal, 

Suicidal, threat to staff... and all critical care pts)


@  -No





- Lab Data


Result diagrams: 


                                 04/20/23 14:02





                                 04/20/23 14:02


                                   Lab Results











  04/20/23 04/20/23 04/20/23 Range/Units





  14:02 14:02 14:02 


 


WBC  7.0    (3.8-10.6)  k/uL


 


RBC  4.56    (4.30-5.90)  m/uL


 


Hgb  14.2    (13.0-17.5)  gm/dL


 


Hct  42.6    (39.0-53.0)  %


 


MCV  93.4    (80.0-100.0)  fL


 


MCH  31.2    (25.0-35.0)  pg


 


MCHC  33.4    (31.0-37.0)  g/dL


 


RDW  13.0    (11.5-15.5)  %


 


Plt Count  208    (150-450)  k/uL


 


MPV  8.0    


 


Neutrophils %  61    %


 


Lymphocytes %  29    %


 


Monocytes %  5    %


 


Eosinophils %  3    %


 


Basophils %  0    %


 


Neutrophils #  4.3    (1.3-7.7)  k/uL


 


Lymphocytes #  2.0    (1.0-4.8)  k/uL


 


Monocytes #  0.4    (0-1.0)  k/uL


 


Eosinophils #  0.2    (0-0.7)  k/uL


 


Basophils #  0.0    (0-0.2)  k/uL


 


PT   10.8   (9.0-12.0)  sec


 


INR   1.0   (<1.2)  


 


APTT   24.2   (22.0-30.0)  sec


 


Sodium    136 L  (137-145)  mmol/L


 


Potassium    4.4  (3.5-5.1)  mmol/L


 


Chloride    100  ()  mmol/L


 


Carbon Dioxide    29  (22-30)  mmol/L


 


Anion Gap    7  mmol/L


 


BUN    17  (9-20)  mg/dL


 


Creatinine    0.45 L  (0.66-1.25)  mg/dL


 


Est GFR (CKD-EPI)AfAm    >90  (>60 ml/min/1.73 sqM)  


 


Est GFR (CKD-EPI)NonAf    >90  (>60 ml/min/1.73 sqM)  


 


Glucose    263 H  (74-99)  mg/dL


 


Plasma Lactic Acid Mark     (0.7-2.0)  mmol/L


 


Calcium    8.6  (8.4-10.2)  mg/dL


 


Magnesium    1.7  (1.6-2.3)  mg/dL


 


Total Bilirubin    0.6  (0.2-1.3)  mg/dL


 


AST    23  (17-59)  U/L


 


ALT    19  (4-49)  U/L


 


Alkaline Phosphatase    72  ()  U/L


 


Troponin I     (0.000-0.034)  ng/mL


 


NT-Pro-B Natriuret Pep     pg/mL


 


Total Protein    6.6  (6.3-8.2)  g/dL


 


Albumin    3.7  (3.5-5.0)  g/dL


 


Influenza Type A (PCR)     (Not Detectd)  


 


Influenza Type B (PCR)     (Not Detectd)  


 


RSV (PCR)     (Not Detectd)  


 


SARS-CoV-2 (PCR)     (Not Detectd)  














  04/20/23 04/20/23 04/20/23 Range/Units





  14:02 14:02 14:02 


 


WBC     (3.8-10.6)  k/uL


 


RBC     (4.30-5.90)  m/uL


 


Hgb     (13.0-17.5)  gm/dL


 


Hct     (39.0-53.0)  %


 


MCV     (80.0-100.0)  fL


 


MCH     (25.0-35.0)  pg


 


MCHC     (31.0-37.0)  g/dL


 


RDW     (11.5-15.5)  %


 


Plt Count     (150-450)  k/uL


 


MPV     


 


Neutrophils %     %


 


Lymphocytes %     %


 


Monocytes %     %


 


Eosinophils %     %


 


Basophils %     %


 


Neutrophils #     (1.3-7.7)  k/uL


 


Lymphocytes #     (1.0-4.8)  k/uL


 


Monocytes #     (0-1.0)  k/uL


 


Eosinophils #     (0-0.7)  k/uL


 


Basophils #     (0-0.2)  k/uL


 


PT     (9.0-12.0)  sec


 


INR     (<1.2)  


 


APTT     (22.0-30.0)  sec


 


Sodium     (137-145)  mmol/L


 


Potassium     (3.5-5.1)  mmol/L


 


Chloride     ()  mmol/L


 


Carbon Dioxide     (22-30)  mmol/L


 


Anion Gap     mmol/L


 


BUN     (9-20)  mg/dL


 


Creatinine     (0.66-1.25)  mg/dL


 


Est GFR (CKD-EPI)AfAm     (>60 ml/min/1.73 sqM)  


 


Est GFR (CKD-EPI)NonAf     (>60 ml/min/1.73 sqM)  


 


Glucose     (74-99)  mg/dL


 


Plasma Lactic Acid Mark  1.9    (0.7-2.0)  mmol/L


 


Calcium     (8.4-10.2)  mg/dL


 


Magnesium     (1.6-2.3)  mg/dL


 


Total Bilirubin     (0.2-1.3)  mg/dL


 


AST     (17-59)  U/L


 


ALT     (4-49)  U/L


 


Alkaline Phosphatase     ()  U/L


 


Troponin I   <0.012   (0.000-0.034)  ng/mL


 


NT-Pro-B Natriuret Pep    94  pg/mL


 


Total Protein     (6.3-8.2)  g/dL


 


Albumin     (3.5-5.0)  g/dL


 


Influenza Type A (PCR)     (Not Detectd)  


 


Influenza Type B (PCR)     (Not Detectd)  


 


RSV (PCR)     (Not Detectd)  


 


SARS-CoV-2 (PCR)     (Not Detectd)  














  04/20/23 Range/Units





  15:29 


 


WBC   (3.8-10.6)  k/uL


 


RBC   (4.30-5.90)  m/uL


 


Hgb   (13.0-17.5)  gm/dL


 


Hct   (39.0-53.0)  %


 


MCV   (80.0-100.0)  fL


 


MCH   (25.0-35.0)  pg


 


MCHC   (31.0-37.0)  g/dL


 


RDW   (11.5-15.5)  %


 


Plt Count   (150-450)  k/uL


 


MPV   


 


Neutrophils %   %


 


Lymphocytes %   %


 


Monocytes %   %


 


Eosinophils %   %


 


Basophils %   %


 


Neutrophils #   (1.3-7.7)  k/uL


 


Lymphocytes #   (1.0-4.8)  k/uL


 


Monocytes #   (0-1.0)  k/uL


 


Eosinophils #   (0-0.7)  k/uL


 


Basophils #   (0-0.2)  k/uL


 


PT   (9.0-12.0)  sec


 


INR   (<1.2)  


 


APTT   (22.0-30.0)  sec


 


Sodium   (137-145)  mmol/L


 


Potassium   (3.5-5.1)  mmol/L


 


Chloride   ()  mmol/L


 


Carbon Dioxide   (22-30)  mmol/L


 


Anion Gap   mmol/L


 


BUN   (9-20)  mg/dL


 


Creatinine   (0.66-1.25)  mg/dL


 


Est GFR (CKD-EPI)AfAm   (>60 ml/min/1.73 sqM)  


 


Est GFR (CKD-EPI)NonAf   (>60 ml/min/1.73 sqM)  


 


Glucose   (74-99)  mg/dL


 


Plasma Lactic Acid Mark   (0.7-2.0)  mmol/L


 


Calcium   (8.4-10.2)  mg/dL


 


Magnesium   (1.6-2.3)  mg/dL


 


Total Bilirubin   (0.2-1.3)  mg/dL


 


AST   (17-59)  U/L


 


ALT   (4-49)  U/L


 


Alkaline Phosphatase   ()  U/L


 


Troponin I   (0.000-0.034)  ng/mL


 


NT-Pro-B Natriuret Pep   pg/mL


 


Total Protein   (6.3-8.2)  g/dL


 


Albumin   (3.5-5.0)  g/dL


 


Influenza Type A (PCR)  Not Detected  (Not Detectd)  


 


Influenza Type B (PCR)  Not Detected  (Not Detectd)  


 


RSV (PCR)  Not Detected  (Not Detectd)  


 


SARS-CoV-2 (PCR)  Not Detected  (Not Detectd)  














Disposition


Clinical Impression: 


 Chest pain, Dyspnea





Disposition: ADMITTED AS IP TO THIS HOSP


Referrals: 


Isac Hayward MD [Primary Care Provider] - 1-2 days


Time of Disposition: 16:28

## 2023-04-21 LAB
CHOLEST SERPL-MCNC: 155 MG/DL (ref 0–200)
GLUCOSE BLD-MCNC: 140 MG/DL (ref 70–110)
GLUCOSE BLD-MCNC: 146 MG/DL (ref 70–110)
GLUCOSE BLD-MCNC: 205 MG/DL (ref 70–110)
GLUCOSE BLD-MCNC: 228 MG/DL (ref 70–110)
HDLC SERPL-MCNC: 52.4 MG/DL (ref 40–60)
LDLC SERPL CALC-MCNC: 88.2 MG/DL (ref 0–131)
TRIGL SERPL-MCNC: 72 MG/DL (ref 0–149)
VLDLC SERPL CALC-MCNC: 14.4 MG/DL (ref 5–40)

## 2023-04-21 RX ADMIN — DICYCLOMINE HYDROCHLORIDE SCH MG: 20 TABLET ORAL at 17:55

## 2023-04-21 RX ADMIN — ASPIRIN SCH: 325 TABLET ORAL at 16:11

## 2023-04-21 RX ADMIN — OXYCODONE HYDROCHLORIDE AND ACETAMINOPHEN SCH EACH: 10; 325 TABLET ORAL at 16:10

## 2023-04-21 RX ADMIN — NITROGLYCERIN SCH INCH: 20 OINTMENT TOPICAL at 00:07

## 2023-04-21 RX ADMIN — INSULIN ASPART SCH: 100 INJECTION, SOLUTION INTRAVENOUS; SUBCUTANEOUS at 17:43

## 2023-04-21 RX ADMIN — OXYCODONE HYDROCHLORIDE AND ACETAMINOPHEN SCH EACH: 10; 325 TABLET ORAL at 09:20

## 2023-04-21 RX ADMIN — IPRATROPIUM BROMIDE SCH MG: 0.5 SOLUTION RESPIRATORY (INHALATION) at 09:25

## 2023-04-21 RX ADMIN — DICYCLOMINE HYDROCHLORIDE SCH MG: 20 TABLET ORAL at 09:18

## 2023-04-21 RX ADMIN — IPRATROPIUM BROMIDE SCH MG: 0.5 SOLUTION RESPIRATORY (INHALATION) at 18:23

## 2023-04-21 RX ADMIN — HEPARIN SODIUM SCH UNIT: 5000 INJECTION INTRAVENOUS; SUBCUTANEOUS at 16:10

## 2023-04-21 RX ADMIN — HEPARIN SODIUM SCH UNIT: 5000 INJECTION INTRAVENOUS; SUBCUTANEOUS at 00:07

## 2023-04-21 RX ADMIN — PANTOPRAZOLE SODIUM SCH: 40 TABLET, DELAYED RELEASE ORAL at 11:18

## 2023-04-21 RX ADMIN — INSULIN ASPART SCH UNIT: 100 INJECTION, SOLUTION INTRAVENOUS; SUBCUTANEOUS at 12:54

## 2023-04-21 RX ADMIN — ISOSORBIDE MONONITRATE SCH MG: 30 TABLET, EXTENDED RELEASE ORAL at 09:18

## 2023-04-21 RX ADMIN — IPRATROPIUM BROMIDE SCH MG: 0.5 SOLUTION RESPIRATORY (INHALATION) at 16:19

## 2023-04-21 RX ADMIN — HEPARIN SODIUM SCH UNIT: 5000 INJECTION INTRAVENOUS; SUBCUTANEOUS at 23:16

## 2023-04-21 RX ADMIN — OXYCODONE HYDROCHLORIDE AND ACETAMINOPHEN SCH EACH: 10; 325 TABLET ORAL at 23:16

## 2023-04-21 RX ADMIN — ATORVASTATIN CALCIUM SCH MG: 40 TABLET, FILM COATED ORAL at 20:25

## 2023-04-21 RX ADMIN — DICYCLOMINE HYDROCHLORIDE SCH MG: 20 TABLET ORAL at 12:54

## 2023-04-21 RX ADMIN — DICYCLOMINE HYDROCHLORIDE SCH MG: 20 TABLET ORAL at 20:25

## 2023-04-21 RX ADMIN — FUROSEMIDE SCH MG: 40 TABLET ORAL at 09:17

## 2023-04-21 RX ADMIN — HEPARIN SODIUM SCH UNIT: 5000 INJECTION INTRAVENOUS; SUBCUTANEOUS at 09:16

## 2023-04-21 RX ADMIN — IPRATROPIUM BROMIDE SCH MG: 0.5 SOLUTION RESPIRATORY (INHALATION) at 13:19

## 2023-04-21 RX ADMIN — ASPIRIN SCH: 325 TABLET ORAL at 09:28

## 2023-04-21 RX ADMIN — SPIRONOLACTONE SCH MG: 25 TABLET, FILM COATED ORAL at 09:19

## 2023-04-21 NOTE — P.CNPUL
History of Present Illness


Consult date: 04/21/23


Reason for consult: COPD


History of present illness: 





72-year-old male patient with known history of COPD, obstructive sleep apnea 

maintenance CPAP therapy on outpatient basis, previous history of Covid 19 

infection with prolonged hospitalization, known history of abdominal adhesions 

with recurrent abdominal pain on the care of Dr. Arthur.  Patient also has 

hypertension and hyperlipidemia noncritical CAD.  The patient came in with some 

epigastric and right anterior chest pain.  His pain is subsided.  He does have 

episodic abdominal pain.  Note that his workup has been essentially negative 

thus far.  His chest x-ray shows chronic COPD changes with some chronic 

interstitial changes lung bases bilaterally, residual from his previous pne

umonias.  EKG was sinus without any acute ischemic changes.  The vital screening

including influenza and Covid 19 and RSV were all negative.  Lactic acid level 

was at 1.9.  Creatinine was 0.45 with a BUN of 17 and a CBC was essentially 

normal.  Troponins are negative and a proBNP level is not elevated at 94.  

[facility] moderate [facility] hypertension with an ejection 

fraction of 55%.  Is currently doing well.  Is stable.  History of any pain.





Review of Systems








Constitutional: No fever, no chills.  No weakness, fatigue or lethargy. 


EENT: No headache.  No dizziness. 


Lungs: Reports shortness of breath, reports cough, no sputum production.  No wh

eezing.


Cardiovascular: No chest pain, no lower extremity edema.  No palpitations.  No 

paroxysmal nocturnal dyspnea.  No orthopnea.  No lightheadedness or dizziness.  

No syncopal episodes.


Abdominal: No abdominal pain.  No nausea, vomiting.  No diarrhea.  No 

constipation.  No bloody or tarry stools.  


Genitourinary: No dysuria..  No urinary retention.


Musculoskeletal: No myalgias.  No muscle weakness, no frequent falls.  No back 

pain.  No neck pain.


Integumentary: No wounds.  No rash.  No unusual bruising.


Neurologic: No aphasia. No facial droop. No change in mentation. No head injury.

No headache. 





Past Medical History


Past Medical History: COPD, Deep Vein Thrombosis (DVT), GERD/Reflux, 

Hyperlipidemia, Hypertension, Pneumonia, Sleep Apnea/CPAP/BIPAP


Additional Past Medical History / Comment(s): COVID IN NOVEMBER 2021.  CHRONIC 

ABD PAIN HAS NOT FOUND DEFINITIVE CAUSE but has a abdominal dilaudid pain pump. 

Hx. of Diverticulitis, Pneumonia (2018),  oxygen @ 5L ATC,  chronic back pain,  

uses CPAP, hx. DVT RT leg , neuropathy feet, hx of pancreatitis.bilateral 

cataracts removed


History of Any Multi-Drug Resistant Organisms: None Reported


Past Surgical History: Appendectomy, Bowel Resection, Cholecystectomy


Additional Past Surgical History / Comment(s): BOWEL RESECTION.  Colonoscopy, 

exp. laparotomy, EGD, LASIK EYE SURGERY, pain pump


Past Anesthesia/Blood Transfusion Reactions: No Reported Reaction


Past Psychological History: No Psychological Hx Reported


Smoking Status: Former smoker


Past Alcohol Use History: Rare


Additional Past Alcohol Use History / Comment(s): quit smoking 2001, smoked 1-

2ppd since age of 18


Past Drug Use History: None Reported





- Past Family History


  ** Brother(s)


Family Medical History: Cancer


Additional Family Medical History / Comment(s): Colon





Medications and Allergies


                                Home Medications











 Medication  Instructions  Recorded  Confirmed  Type


 


Spironolactone [Aldactone] 25 mg PO DAILY 11/23/15 04/20/23 History


 


Furosemide [Lasix] 40 mg PO DAILY 12/03/19 04/20/23 History


 


Omeprazole 40 mg PO DAILY 05/25/22 04/20/23 History


 


lisinopriL [Zestril] 5 mg PO DAILY 05/25/22 04/20/23 History


 


oxyCODONE-APAP 10-325MG [Percocet 1 tab PO Q8H 05/25/22 04/20/23 History





 mg]    


 


tiZANidine HCL 4 mg PO BID PRN 05/25/22 04/20/23 History


 


Albuterol Sulfate [Albuterol 1 puff PO RT-BID 04/20/23 04/20/23 History





Sulfate Hfa]    


 


Atorvastatin [Lipitor] 20 mg PO HS 04/20/23 04/20/23 History


 


Dextroamphetamine/Amphetamine 30 mg PO BID@0900,1600 04/20/23 04/20/23 History





[Adderall]    


 


Dicyclomine [Bentyl] 20 mg PO QID 04/20/23 04/20/23 History


 


Ipratropium Nebulized [Atrovent 0.5 mg INHALATION RT-QID 04/20/23 04/20/23 

History





Nebulized 0.2 MG/ML]    


 


Isosorbide Dinitrate 30 mg PO DAILY 04/20/23 04/20/23 History








                                    Allergies











Allergy/AdvReac Type Severity Reaction Status Date / Time


 


bee pollen Allergy  Anaphylaxis Verified 04/20/23 15:21


 


Iodinated Contrast Media Allergy  Anaphylaxis Verified 04/20/23 15:21





[Iodinated Contrast Media -     





IV Dye]     


 


metformin AdvReac  Abdominal Verified 04/20/23 15:21





   Pain  














Physical Exam


Vitals: 


                                   Vital Signs











  Temp Pulse Pulse Resp BP BP Pulse Ox


 


 04/21/23 14:58  98.1 F   70  22   113/88  96


 


 04/21/23 13:30   72     


 


 04/21/23 13:19   68     


 


 04/21/23 12:17     14   


 


 04/21/23 10:00     18   


 


 04/21/23 09:40   76     


 


 04/21/23 09:25   76     


 


 04/21/23 06:34  98.4 F   58 L  15   106/69  95


 


 04/21/23 02:50  98.2 F   51 L  17   97/56  97


 


 04/20/23 18:57   68     


 


 04/20/23 18:49   64     


 


 04/20/23 18:48  97.7 F   71  18   128/81  95


 


 04/20/23 18:05   69   18  110/77   98








                                Intake and Output











 04/21/23 04/21/23 04/21/23





 06:59 14:59 22:59


 


Intake Total  298 


 


Balance  298 


 


Intake:   


 


  Oral  298 


 


Other:   


 


  Voiding Method  Toilet 


 


  # Voids 1 1 

















Gen: This is an obese 72-year-old  male.  He sitting in bed and appears

to be comfortable at rest.  No acute respiratory distress noted.


Head exam was generally normal. There was no scleral icterus or corneal arcus. 

Mucous membranes were moist.


HEENT: Head is atraumatic, normocephalic. Pupils equal, round. Sclerae is 

anicteric. 


NECK: Supple. No JVD. . 


LUNGS: Poor air entry.  No intercostal retractions.


HEART: Regular rate and rhythm. No murmur. 


ABDOMEN: Soft  No tenderness.


EXTREMITIES: No pedal edema.  No calf tenderness.


NEUROLOGICAL: Patient is awake, alert and oriented x3.


 








Results





- Laboratory Findings


CBC and BMP: 


                                 04/20/23 14:02





                                 04/20/23 14:02


PT/INR, D-dimer











PT  10.8 sec (9.0-12.0)   04/20/23  14:02    


 


INR  1.0  (<1.2)   04/20/23  14:02    








Abnormal lab findings: 


                                  Abnormal Labs











  04/20/23 04/20/23 04/21/23





  14:02 22:01 07:54


 


Sodium  136 L  


 


Creatinine  0.45 L  


 


Glucose  263 H  


 


POC Glucose (mg/dL)   187 H  140 H














  04/21/23





  12:10


 


Sodium 


 


Creatinine 


 


Glucose 


 


POC Glucose (mg/dL)  205 H














- Diagnostic Findings


Chest x-ray: image reviewed





Assessment and Plan


Plan: 








Episodic abdominal/epigastric pain related and attributed to chronic abdominal 

adhesions under the care of general surgery





Chest pain, nonspecific, atypical in nature, negative cardiac enzymes, negative 

EKG





COPD currently inactive and stable, patient is chronic hypoxic respiratory 

failure mainly on oxygen 3 L per minute nasal cannula





Obstructive sleep apnea without CPAP





Previous history of Covid 19 infection





Hypertension





Hyperlipidemia





Noncritical CAD





Obesity, losing weight





Acid reflux





Chronic abdominal pain, although the care of general surgery





Chronic back pain





Previous history of a DVT of the right lower extremity





Plan





No active pulmonary issues


Continue using CPAP from home


Oxygen therapy


Incentive spirometer


Pulmonary and critical care services will sign off

## 2023-04-21 NOTE — P.GSCN
History of Present Illness


Consult date: 04/21/23


History of present illness: 





CHIEF COMPLAINT: Chest pain





HISTORY OF PRESENT ILLNESS: This is a 72-year-old male who presented to hospital

with complaints of shortness of breath and chest heaviness.  Patient reports 

he's had chest discomfort for about 2 weeks.  Symptoms apparently started after 

his upper GI study on 04/04/2013 at [facility].  Patient also 

reports having difficulty with swallowing solid foods.  The upper GI results had

shown evidence of silent aspiration.  Patient was seen by cardiology service no 

evidence of acute coronary syndrome.  Surgical service consulted due to 

abdominal pain and aspiration during esophagram.  





PAST MEDICAL HISTORY: 


See list.





PAST SURGICAL HISTORY: 


See list.





MEDICATIONS: 


See list.





ALLERGIES: 


See list.





SOCIAL HISTORY: No illicit drug use.  





REVIEW OF SYSTEMS: 


CONSTITUTIONAL: Denies fever or chills.


HEENT: Denies blurred vision, vision changes, or eye pain. Denies hemoptysis 


ENDOCRINE: Denies heat or cold intolerance.


CARDIOVASCULAR: Denies chest pain or pressure.


RESPIRATORY: No shortness of breath. 


GASTROINTESTINAL: Denies abdominal pain. Denies nausea or vomiting.


NEURO: Denies history of seizures.


PSYCH: No depression or suicidal ideation


HEMATOLOGIC: Denies bleeding disorders.


LYMPHATIC:  The patient denies any lumps and bumps around the neck. 


GENITOURINARY:  Denies any blood in urine or increased urinary frequency.  


MUSCULOSKELETAL:  Denies myalgias. Denies joint swelling. Denies decreased range

of motion beyond patients baseline.


SKIN: Denies pruitis. Denies rash.





PHYSICAL EXAM: 


VITAL SIGNS: Reviewed


GENERAL: Well-developed in no acute distress. 


HEENT:  No sclera icterus. Extraocular movements grossly intact.  Moist buccal 

mucosa. 


Head is atraumatic, normocephalic. Hears conversational speech. No nasal 

drainage.


NECK:  Supple without lymphadenopathy.


CHEST:  Non-labored respirations and equal bilateral excursions. 


CARDIOVASCULAR:  Palpable 2+ radial pulses.


ABDOMEN:  Soft.  Nondistended. Nontender


MUSCULOSKELETAL:  No clubbing or cyanosis.


NEUROLOGIC:  No focal or lateralizing signs.  Cranial nerves II through XII 

grossly intact.


PSYCH:  Appropriate affect.  Alert and oriented to person, place and time.


SKIN: Well perfused.  Good skin turgor. 





LABORATORY DATA:


WBC 7.0 hgb 14.2 platelets 208


Sodium 136 potassium 4.4 creatinine 0.45


LFTs normal troponins negative


Influenza, RSV and COVID-19 not detected 





IMAGING:


Chest x-ray background COPD changes with similar bibasilar patchy airspace 

opacities.  This could represent pneumonia versus pulmonary fibrotic changes.








ASSESSMENT: 


1. Dysphagia


2. Silent aspiration noted on upper GI 


3. Chest pressure


4. Prior surgical history of lysis of adhesions 





PLAN: 


-Consult speech therapy for swallowing evaluation


-Recommend patient to be nothing by mouth until evaluated by speech therapy


-Recommend EGD for further evaluation of dysphagia


-Continue supportive care





Thank you for this consultation











Physician Assistant note has been reviewed by physician. Signing provider agrees

with the documented findings, assessment, and plan of care. 














REASON FOR CONSULTATION: Aspiration pneumonia, atypical chest





HISTORY OF PRESENT ILLNESS: The patient is a 72 year old male who reports over 

several weeks atypical chest pain including dysphagia trouble swallowing.  

Patient had diagnostic studies at outside facility including barium swallow with

new findings of aspiration pneumonia.  He reports confirmed difficulty eating 

textured foods.  Patient admitted for aspiration pneumonia.  Patient does have 

pre-existing history of pulmonary disease and oxygen dependent.





PAST MEDICAL HISTORY: 


See list and reviewed





PAST SURGICAL HISTORY: 


See list and reviewed





MEDICATIONS: 


See list and reviewed





ALLERGIES: 


See list and reviewed





SOCIAL HISTORY: See list and reviewed





FAMILY HISTORY:  See list and reviewed





REVIEW OF ORGAN SYSTEMS:


CONSTITUTIONAL:  No fevers or chills. No recent weight loss.  Obesity due to 

excess calories, BMI 35.7


EYES: Denies any trouble with vision. No glasses.


HEENT:  No difficulties with hearing. No nosebleeds.  No difficulty swallowing. 


RESPIRATORY: Has chronic obstructive pulmonary disease.  Recent pneumonia.


CARDIOVASCULAR:  Has hyperlipidemia.  Has hypertensive heart disease.  Has 

congestive heart failure.


GASTROINTESTINAL: Has gastroesophageal reflux disease and has chronic abdominal 

pain due to adhesions.


GENITOURINARY:  Denies any blood in urine or increased urinary frequency.  


NEUROLOGICAL:  Denies any numbness or tingling along the distal extremities. No 

seizure disorders or headaches.


MUSCULOSKELETAL:  Denies any back pain, stiffness or joint arthritis. 


SKIN: No current skin cancer. No rash.


PSYCHIATRIC:  Denies current depression or suicidal thoughts.


ENDOCRINE:  Denies current thyroid disorders. Denies any blood sugar glucose 

intolerance.


HEME/LYMPHATIC: Denies any lumps and bumps around the neck.  Past deep venous 

thrombosis.


ALLERGY/IMMUNOLOGY:  No immunoglobulin therapy. No immune deficiencies.


BREAST: Denies current breast lumps, pain or nipple discharge.





PHYSICAL EXAM:


VITALS: Reviewed


CONSTITUTIONAL:  Well developed and in no acute distress. 


EYES:  Conjuctivae without sclera icterus.  Extraocular movements grossly 

intact. 


HEAD, EARS, NOSE, THROAT: Moist buccal mucosa. Head is atraumatic, 

normocephalic. Hears conversational speech. No nasal drainage. 


NECK:  Supple. No JV distention. No thyroidomegaly.


RESPIRATORY:  Non-labored respirations and equal bilateral excursions. No gross 

wheezes. 


CARDIOVASCULAR:   Palpable 2+ radial pulses.


ABDOMEN:  Obese.  Nontender.


LYMPH: No neck lymphadenopathy. 


MUSCULOSKELETAL:  No clubbing cyanosis or edema


SKIN:  Warm and well perfused with good skin turgor.


NEUROLOGIC: Cranial nerves II through XII grossly intact.  No focal or 

lateralizing signs. 


PSYCH:  Appropriate affect.  Alert and oriented to person, place and time. 

Displays appropriate insight.





CLINCAL LABS: Reviewed.  WBC normal.





IMAGING: Independently reviewed.  No free air.





RADIOLOGY: Report reviewed,  Chest x-ray report demonstrates atelectasis and 

COPD.





RECORDS: Outside records from institution regarding barium swallow reviewed with

silent aspiration





ASSESSMENT: 


1.  Aspiration pneumonia


2.  Dysphagia


3.  Morbid obesity to assist calories, BMI 35.7


4.  Chronic obstructive pulmonary disease


5.  Pneumonia


6.  Hypertensive heart disease


7.  Personal history deep venous thrombosis


8.  Hyperlipidemia


9.  Congestive heart failure





PLAN: 


1.  He has pre-existing dysphagia and may benefit from symptomatic treatment 

with upper endoscopy


2.  Pending pulmonary consultation regarding silent chronic aspiration.  May 

need further assessment of feeding tube of chronic aspiration progressively.


3.  Recommend bedside swallow studies pathologist for liquid nectar  fluids and 

oropharyngeal dysphagia





ADVANCE DIRECTIVE: 





Thank you for this kind consultation.








Past Medical History


Past Medical History: COPD, Deep Vein Thrombosis (DVT), GERD/Reflux, 

Hyperlipidemia, Hypertension, Pneumonia, Sleep Apnea/CPAP/BIPAP


Additional Past Medical History / Comment(s): COVID IN NOVEMBER 2021.  CHRONIC 

ABD PAIN HAS NOT FOUND DEFINITIVE CAUSE but has a abdominal dilaudid pain pump. 

Hx. of Diverticulitis, Pneumonia (2018),  oxygen @ 5L ATC,  chronic back pain,  

uses CPAP, hx. DVT RT leg , neuropathy feet, hx of pancreatitis.bilateral 

cataracts removed


History of Any Multi-Drug Resistant Organisms: None Reported


Past Surgical History: Appendectomy, Bowel Resection, Cholecystectomy


Additional Past Surgical History / Comment(s): BOWEL RESECTION.  Colonoscopy, 

exp. laparotomy, EGD, LASIK EYE SURGERY, pain pump


Past Anesthesia/Blood Transfusion Reactions: No Reported Reaction


Past Psychological History: No Psychological Hx Reported


Smoking Status: Former smoker


Past Alcohol Use History: Rare


Additional Past Alcohol Use History / Comment(s): quit smoking 2001, smoked 1-

2ppd since age of 18


Past Drug Use History: None Reported





- Past Family History


  ** Brother(s)


Family Medical History: Cancer


Additional Family Medical History / Comment(s): Colon





Medications and Allergies


                                Home Medications











 Medication  Instructions  Recorded  Confirmed  Type


 


Spironolactone [Aldactone] 25 mg PO DAILY 11/23/15 04/20/23 History


 


Furosemide [Lasix] 40 mg PO DAILY 12/03/19 04/20/23 History


 


Omeprazole 40 mg PO DAILY 05/25/22 04/20/23 History


 


lisinopriL [Zestril] 5 mg PO DAILY 05/25/22 04/20/23 History


 


oxyCODONE-APAP 10-325MG [Percocet 1 tab PO Q8H 05/25/22 04/20/23 History





 mg]    


 


tiZANidine HCL 4 mg PO BID PRN 05/25/22 04/20/23 History


 


Albuterol Sulfate [Albuterol 1 puff PO RT-BID 04/20/23 04/20/23 History





Sulfate Hfa]    


 


Atorvastatin [Lipitor] 20 mg PO HS 04/20/23 04/20/23 History


 


Dextroamphetamine/Amphetamine 30 mg PO BID@0900,1600 04/20/23 04/20/23 History





[Adderall]    


 


Dicyclomine [Bentyl] 20 mg PO QID 04/20/23 04/20/23 History


 


Ipratropium Nebulized [Atrovent 0.5 mg INHALATION RT-QID 04/20/23 04/20/23 

History





Nebulized 0.2 MG/ML]    


 


Isosorbide Dinitrate 30 mg PO DAILY 04/20/23 04/20/23 History








                                    Allergies











Allergy/AdvReac Type Severity Reaction Status Date / Time


 


bee pollen Allergy  Anaphylaxis Verified 04/20/23 15:21


 


Iodinated Contrast Media Allergy  Anaphylaxis Verified 04/20/23 15:21





[Iodinated Contrast Media -     





IV Dye]     


 


metformin AdvReac  Abdominal Verified 04/20/23 15:21





   Pain  














Surgical - Exam


                                   Vital Signs











Temp Pulse Resp BP Pulse Ox


 


 97.9 F   91   20   111/72   93 L


 


 04/20/23 13:31  04/20/23 13:31  04/20/23 13:31  04/20/23 13:31  04/20/23 13:31














Results





- Labs





                                 04/20/23 14:02





                                 04/20/23 14:02


                  Abnormal Lab Results - Last 24 Hours (Table)











  04/20/23 04/20/23 04/21/23 Range/Units





  14:02 22:01 07:54 


 


Sodium  136 L    (137-145)  mmol/L


 


Creatinine  0.45 L    (0.66-1.25)  mg/dL


 


Glucose  263 H    (74-99)  mg/dL


 


POC Glucose (mg/dL)   187 H  140 H  ()  mg/dL








                                 Diabetes panel











  04/20/23 04/20/23 Range/Units





  14:02 14:02 


 


Sodium  136 L   (137-145)  mmol/L


 


Potassium  4.4   (3.5-5.1)  mmol/L


 


Chloride  100   ()  mmol/L


 


Carbon Dioxide  29   (22-30)  mmol/L


 


BUN  17   (9-20)  mg/dL


 


Creatinine  0.45 L   (0.66-1.25)  mg/dL


 


Glucose  263 H   (74-99)  mg/dL


 


Calcium  8.6   (8.4-10.2)  mg/dL


 


AST  23   (17-59)  U/L


 


ALT  19   (4-49)  U/L


 


Alkaline Phosphatase  72   ()  U/L


 


Total Protein  6.6   (6.3-8.2)  g/dL


 


Albumin  3.7   (3.5-5.0)  g/dL


 


Triglycerides   72.00  (0..00)  mg/dL


 


HDL Cholesterol   52.40  (40.00-60.00)  mg/dL








                                  Calcium panel











  04/20/23 Range/Units





  14:02 


 


Calcium  8.6  (8.4-10.2)  mg/dL


 


Albumin  3.7  (3.5-5.0)  g/dL








                                 Pituitary panel











  04/20/23 Range/Units





  14:02 


 


Sodium  136 L  (137-145)  mmol/L


 


Potassium  4.4  (3.5-5.1)  mmol/L


 


Chloride  100  ()  mmol/L


 


Carbon Dioxide  29  (22-30)  mmol/L


 


BUN  17  (9-20)  mg/dL


 


Creatinine  0.45 L  (0.66-1.25)  mg/dL


 


Glucose  263 H  (74-99)  mg/dL


 


Calcium  8.6  (8.4-10.2)  mg/dL








                                  Adrenal panel











  04/20/23 Range/Units





  14:02 


 


Sodium  136 L  (137-145)  mmol/L


 


Potassium  4.4  (3.5-5.1)  mmol/L


 


Chloride  100  ()  mmol/L


 


Carbon Dioxide  29  (22-30)  mmol/L


 


BUN  17  (9-20)  mg/dL


 


Creatinine  0.45 L  (0.66-1.25)  mg/dL


 


Glucose  263 H  (74-99)  mg/dL


 


Calcium  8.6  (8.4-10.2)  mg/dL


 


Total Bilirubin  0.6  (0.2-1.3)  mg/dL


 


AST  23  (17-59)  U/L


 


ALT  19  (4-49)  U/L


 


Alkaline Phosphatase  72  ()  U/L


 


Total Protein  6.6  (6.3-8.2)  g/dL


 


Albumin  3.7  (3.5-5.0)  g/dL

## 2023-04-21 NOTE — FL
EXAMINATION TYPE: FL barium swallow w video

 

DATE OF EXAM: 4/21/2023

 

MODIFIED SWALLOW / DEGLUTITION STUDY

 

CLINICAL HISTORY: Dysphagia. History of silent aspiration.

 

TECHNIQUE:  Deglutition study is performed utilizing thin liquid barium, honey and nectar thick liqui
d barium, barium thick applesauce, and barium coated cracker. A total of 1 minute 37 seconds of fluor
oscopic time utilized during procedure. 0 images are saved to PACS.

 

COMPARISON: Prior esophagram October 30, 2019.

 

FINDINGS: The oral and pharyngeal phases show satisfactory initiation and propagation with all modali
ties tested. Satisfactory mastication is seen with solid modalities tested.  There is deep penetratio
n with thin liquid barium and nectar thick liquid barium. No improvement with chin tuck procedure wit
hin liquid barium. Patient is able to clear barium with coughing. No aspiration is noted with these o
r more viscous modalities. Mild pharyngeal residuals with more viscous modalities incidentally noted.


 

IMPRESSION: Deep penetration with less viscous modalities. No aspiration observed.  Please refer to zohreh dillon therapist notes for further details if necessary.

## 2023-04-21 NOTE — HP
HISTORY AND PHYSICAL



The patient admitted through the emergency room.



CHIEF COMPLAINT:

The patient presented to the ER at [facility] with shortness of breath and chest pain

across his chest.



HISTORY OF PRESENT ILLNESS:

A 72-year-old white male presented to the emergency room department with a history of

significant chest pain across the chest started yesterday and did become intermittent.

No radiation to the neck or the arm, but across the chest and heavy weight on the

chest, some sternal discomfort and intermittent.  His wife got concerned and he got

concerned and came to the hospital at [facility].



PAST MEDICAL HISTORY:

He had underlying history of COPD and emphysema, has been treated by Dr. Hoskins. He

had history also of multiple surgeries of the abdomen with adhesions and he has been

followed by Dr. Martin and he has plan to be seen by Dr. Martin approximately the

1st week of May.  The patient has also history of underlying chronic respiratory

failure and on oxygen supplementation.



He had a gallbladder surgery that was done in the past.  He had history of diabetes

mellitus type 2 and he had high risk of coronary artery disease.



He denied any diaphoresis, but this has become progressively worse.



History of congestive heart failure in the past as well and history of peripheral

edema.



He was on chronic pain and he has been monitored by Dr. Paz, the [facility] in

[place].



MEDICATIONS:

He was on,

1. Spironolactone 25 mg once daily.

2. Furosemide 40 mg daily.

3. Omeprazole 40 mg daily.

4. Lisinopril 5 mg daily.

5. Oxycodone 10/325.

6. Percocet he takes tablet every 8 hours plus that he had a pump placed on the right

    mid abdomen and it is a pump with Dilaudid.

7. He also treated with albuterol inhaler sulfate 2 puffs q.i.d.

8. He has been also on tizanidine/HCL and he was taking 4 mg p.o. b.i.d.

9. He was also on Adderall 30 mg p.o. b.i.d. and dicyclomine which is equal to Bentyl

    and he was on 20 mg q.i.d.

10.He is on ipratropium/Atrovent 2 mg/mL and he take 0.5 mg inhalation 4 times a day.

11.He is on isosorbide mononitrate 30 mg daily.



ALLERGIES:

He has an allergy to bee and pollen with anaphylaxis.  He also has allergy to iodinated

contrast media, IV dye, and has adverse effect with metformin for his diabetes causing

abdominal pain.



REVIEW OF SYSTEMS:

NEUROPSYCHIATRY:  He is conscious, alert, has no blurred vision.  No falling attacks.

CARDIOVASCULAR:  He had the pain and pressure as somebody sitting on his chest

clinically and across his chest, chest burning or discomfort.  Could not express more

than that except he attributed as pain.

RESPIRATORY:  He had no cough or expectoration.  However, he had in the chest x-ray

done in the ER, basilar infiltrate, and we will consult Cardiology as well as the

Pulmonary and Critical Care, Dr. Hoskins.

GI: No nausea, vomiting, diarrhea, or constipation.

: No dysuria or hematuria.

MUSCULOSKELETAL:  He had arthritis, but not causing any major pain at this time .

NEURO:  No history of stroke in the past.



PAST MEDICAL HISTORY:

He had history of COPD, emphysema, hyperlipidemia, hypertension, pneumonia, and sleep

apnea and he is on the CPAP.  He had history of DVT in the past, GERD with reflux and

also recently he had a test for the esophagus and modified barium swallow when was done

in the [facility] was indicating that he had some motility problem with

the esophagus as well as he has aspiration. He denied any aspiration, but he stated

that he does not have heartburn and no coughing or choking with meals.  He is currently

on oxygen with also on CPAP.  He has as well history of pancreatitis and neuropathy of

the feet secondary to diabetes.



PAST SURGICAL HISTORY:

He had appendectomy, bowel resection, and cholecystectomy.  At the time of the surgery

on the bowel, he had a colostomy and reversed subsequently with exploratory laparotomy.

He had LASIK eye surgery and pain pump placement.



SOCIAL HISTORY:

He is a former smoker.



FAMILY HISTORY:

Cancer.



PHYSICAL EXAMINATION:

GENERAL:  The patient appeared well developed, nourished, hydrated, but his chest pain

that was a concern across the chest.

HEENT:  The head was normocephalic, atraumatic. Oropharynx, he had upper dentures.  The

lower, he has only a few teeth in the right side of the lower jaw. He stated that able

to eat and swallow.  The pupil was equal reactive and he has normal conjunctiva and no

icterus.  Normal eye movement.

NECK:  Supple.  No JVD.  No thyromegaly.  No lymphadenopathy.  Trachea midline.

PULMONARY:  He had history of increased hyperinflation of the lung with emphysema.  He

had no wheezes, no rhonchi with decreased air entry on the lower bases.

CARDIOVASCULAR:  He had regular sinus rhythm and no appreciated murmur on examination.

ABDOMEN:  Obese, positive bowel sounds and has multiple scars.

SKIN: No lesions.

NEUROLOGIC:  Stable and he is oriented. Cranial nerves were intact and he has normal

speech and no evidence of stroke in the past.

MUSCULOSKELETAL: He used to have edema of the lower extremities; however, was managed

and he does not have  edema and the known lymphadenopathy.

VITAL SIGNS:  Temperature 97.9 F oral and pulse rate 91 to 92, his respiratory rate 20

to 18 per minute.  His blood pressure is fairly well controlled, 111/72 and 102/63 with

the oxygen saturation 93% to 98% by the oximeter in the ER.  The patient was seen and

evaluated in the emergency room and in the module 11.



DIAGNOSTIC STUDIES:

His EKG was normal sinus rhythm with no acute ST-segment elevation or depression.



LABORATORY DATA:

Laboratory indicating that white count was 7 with hemoglobin 14.2 and hematocrit 42.6,

platelet count is 208 and normal differential.  The ProTime is 10.8, and INR 1, and the

PTT 24.2.  His chemistry indicating sodium 136 with a normal 137, potassium 4.4,

chloride 100, carbon dioxide 29, and anion gap 7. BUN 17 and creatinine 0.45 with the

EGFR for non- more than 90.  His glucose is 263.  I do not have an idea when he

ate last time, but he is diabetic anyway, and calcium 8.6 and magnesium 1.7, total

bilirubin 0.6, AST 23, ALT 19, and alkaline phosphatase 72, total protein 6.6, and

albumin 3.7. He had also lactic acid venous 1.9, which is normal.  The troponin 1st

reading was less than 0.012 and his proBNP is 94 with no evidence of congestive heart

failure.  We have the results on the influenza A PCR.  Influenza B PCR not detected and

we have RSV not detected and the COVID test not detected. Because of his disposition,

per the ER as they called me to admit the patient with the underlying shortness of

breath, dyspnea, chest pain across the chest and which has been progressive since

yesterday with no radiation, but it is substernal and heavy feeling on his chest.



ASSESSMENT:

Chest pain and underlying history of upper GI indicating possibility of aspiration and

underlying x-ray showing the infiltrate and we will be consulting.



PLAN:

Consulting Cardiology as well as the Pulmonary and Critical Care, Dr. Hoskins.

Further evaluation depend on the patient's condition and progression.  We are also

requesting the results from [facility] of the esophageal to be obtained on

to the chart.





MMODL / IJN: [id number] / Job#: [id number]

## 2023-04-21 NOTE — P.PN
Subjective





Progress note


Date of service 04/21/2023


Dictation by Dr. Hayward.





Patient seen face-to-face and discussed with the patient in detail.


Patient seen today byrotation and cardiologist however in the dictation of the 

cardiology by the nurse practitioner in never mentioned the cardiology rounding 

name.


The conclusion of his consultation that patient has no evidence of pain 

originating from cardiac wise with the normal troponin and could be muscular in 

origin.





Patient today still complaining of chest discomfort across his chest and unclear

the etiology of that discomfort and across the upper abdomen.


Patient has recently on 4/04/20/2023 esophagogram with contrast and was double 

contrast and found in that testing which was done at [facility] 

indication that patient had silent aspiration into the trachea and proximal 

bronchi and they terminated any for further continuation of the testing and the 

did subsequently x-ray of the abdomen and that was nonobstructive bowel gas.  

Patient has history of cholecystectomy.





Patient has history of significant abdominal pain in the upper chest and upper 

abdomen and he was scheduled by Dr. Ivan for resolving adhesion of the upper

abdomen however at the time of the surgery he had pneumonia and the surgical 

intervention canceled and which was at that time admitted to [facility].





Patient also had history of this admission as admitted on observation and the 

chest x-ray done in the ER showed bilateral basilar infiltrate and consultation 

with Dr. Padilla was requested, patient not seen yet by Dr. Padilla for the 

pulmonary evaluation patient uses CPAP at home with the underlying COPD and 

severe hypoxemia and he is on chronic oxygen with chronic respiratory failure.  

And Dr. Padilla consultation was placed yesterday and hopefully he will be 

seeing him today.





With the patient discussion and his systemic complaint still with the chest 

discomfort and since this morning still present and feeding of across the chest 

and heavy feeling including the upper abdomen will be consulting Dr. Ivan 

the surgeon to assess his previous surgical situation as well as if she has any 

opinion and the report of the test on the chart and I did discussed with HARITHA Winkler to point out the report to Dr. Ivan and to see her opinion and if any

for further recommendation from her will be appreciated.





This patient seen today his temperature 98.4 F oral, his heart rate 76 , 

respiratory rate 18/m with shortness of breath and his blood pressure 106/69 

with a mean 81 and oxygen saturation 95% on with the nasal cannula 4 L.


Patient is diabetic and his blood glucose is fluctuating and placed on NovoLog 

insulin to scale 3 times a day before meals meals.


On exam the head normocephalic and atraumatic and oropharynx as a only a few 

teeth on the right lower jaw tongue is normal and he has normal swallowing with 

no choking when he eats as he stated but there is a silent aspiration.


Neck was supple


Chest increased anteroposterior diameter no wheezes no rhonchi's with decreased 

air entry in the lower bases bilaterally


Abdomen is obese positive bowel sounds and no tenderness excepted that he had 

right mid abdomen pump for pain control with the chronic history of chronic pain

syndrome and he is treated by Dr. Paz in [facility] 

clinic with the use of Dilaudid pump


Extremities no edema and positive pulses


Neurologically no history of stroke.





Assessment: And plan


#1 still patient complaining of chest discomfort/pain with a heavy feeling on 

the midsternum and the cardiology has cleared him from the cardiac point of 

view.


#2 underlying recent testing with the silent aspiration the etiology is unclear.


#3 history of adhesions with multiple surgery and abdomen and he was planned for

surgical intervention to remove the adhesion by Dr. Ivan could be causing 

his symptoms will be consulting Dr. Ivan as well as Dr. Padilla


#4 continue the oxygen therapy and


#5 diabetes mellitus2


#6 obesity.


#7 we will wait for the advice and no pain of Dr. Ivan and Dr. Padilla.





Objective





- Vital Signs


Vital signs: 


                                   Vital Signs











Temp  98.4 F   04/21/23 06:34


 


Pulse  76   04/21/23 09:40


 


Resp  18   04/21/23 10:00


 


BP  106/69   04/21/23 06:34


 


Pulse Ox  95   04/21/23 06:34


 


FiO2      








                                 Intake & Output











 04/20/23 04/21/23 04/21/23





 18:59 06:59 18:59


 


Intake Total   180


 


Balance   180


 


Weight 109.769 kg 109.769 kg 


 


Intake:   


 


  Oral   180


 


Other:   


 


  Voiding Method   Toilet


 


  # Voids  1 














- Labs


CBC & Chem 7: 


                                 04/20/23 14:02





                                 04/20/23 14:02


Labs: 


                  Abnormal Lab Results - Last 24 Hours (Table)











  04/20/23 04/20/23 04/21/23 Range/Units





  14:02 22:01 07:54 


 


Sodium  136 L    (137-145)  mmol/L


 


Creatinine  0.45 L    (0.66-1.25)  mg/dL


 


Glucose  263 H    (74-99)  mg/dL


 


POC Glucose (mg/dL)   187 H  140 H  ()  mg/dL

## 2023-04-21 NOTE — P.CRDCN
History of Present Illness


Consult date: 04/21/23


History of present illness: 





History of present illness:


This is a 72-year-old male patient of Dr. Alba with past medical history of 

obstructive sleep apnea with CPAP, hypertension, hyperlipidemia, noncritical CAD

by cath in 2020, history of Covid pneumonia 2021.  We have been asked to 

evaluate patient for chest pain and dyspnea.  Patient gives history that he has 

had chest pain while he was sitting and went across his upper chest.  It was 

going on for few days and getting worse.  He also has a cough and he was 

concerned because he had a barium swallow done 16 days ago and he thought he 

swallowed some of the barium.  No barium is evident on his chest x-ray.  His 

vital signs were stable at the time of presentation.





EKG sinus rhythm at 90 bpm, no acute ST changes


Chest x-ray: Background COPD with similar bibasilar patchy airspace opacities.  

This could represent pneumonia versus pulmonary fibrotic changes.


CBC within normal limits.  INR 1.  Sodium 136, potassium 4.4, BUN 17 and 

creatinine 0.45, CO2 29.  Blood sugar 263.  Lactic acid 1.9.  Calcium 8.6, mag

nesium 1.7.  Liver function tests are normal.  Troponin negative 3, proBNP 94, 

albumin 3.7, cholesterol 155, LDL 88, HDL 52.  Influenza A, influenza B, RSV, 

Covid 19 not detected.


Home cardiac medications: Atorvastatin 20 mg at bedtime, Lasix 40 mg daily, 

Imdur 30 mg daily, lisinopril 5 mg daily, Aldactone 25 mg daily


Echocardiogram 2018: EF 55%, moderate pulmonary hypertension


Lexiscan stress test 2018 inferior wall fixed defect


Cardiac catheterization 10/2020 revealed codominant system, ectopic right 

coronary comes off from the noncoronary cusp 35% stenosis, codominant circumflex

40% narrowing in the midportion, LAD and left main free of significant.  Normal 

filling pressures.  No gradient.








Review Of Systems:


At the time of my evaluation:


Constitutional: No fever, no chills.  No weakness, fatigue or lethargy. 


EENT: No headache.  No dizziness. 


Lungs: Reports shortness of breath, reports cough, no sputum production.  No 

wheezing.


Cardiovascular: No chest pain, no lower extremity edema.  No palpitations.  No 

paroxysmal nocturnal dyspnea.  No orthopnea.  No lightheadedness or dizziness.  

No syncopal episodes.


Abdominal: No abdominal pain.  No nausea, vomiting.  No diarrhea.  No 

constipation.  No bloody or tarry stools.  


Genitourinary: No dysuria..  No urinary retention.


Musculoskeletal: No myalgias.  No muscle weakness, no frequent falls.  No back 

pain.  No neck pain.


Integumentary: No wounds.  No rash.  No unusual bruising.


Neurologic: No aphasia. No facial droop. No change in mentation. No head injury.

No headache. 








Physical examination:


Gen: This is an obese 72-year-old  male.  He sitting in bed and appears

to be comfortable at rest.  No acute respiratory distress noted.


VS: reviewed.  Blood pressure 106/69, heart rate in the 50s to 70s, pulse ox 95%

on room air, afebrile.


HEENT: Head is atraumatic, normocephalic. Pupils equal, round. Sclerae is anic

teric. 


NECK: Supple. No JVD. . 


LUNGS: Poor air entry.  No intercostal retractions.


HEART: Regular rate and rhythm. No murmur. 


ABDOMEN: Soft  No tenderness.


EXTREMITIES: No pedal edema.  No calf tenderness.


NEUROLOGICAL: Patient is awake, alert and oriented x3.


 





Assessment:


Chest pain, acute coronary syndrome has been ruled out


Chest pain most likely muscular skeletal due to coughing


COPD


Obstructive sleep apnea with CPAP


Hypertension


Hyperlipidemia


Noncritical CAD





Plan:


Resume patient's home cardiac medications


No further cardiac workup at this time


Patient may follow-up with Dr. ALAN Alba in 2 weeks.


Cardiology will sign off and follow on an as-needed basis.  Please reconsult if 

any new concerns develop.


Thank you kindly for this consultation.





Nurse practitioner note has been reviewed, I agree with documented findings and 

plan of care.  Patient was seen and examined.





Past Medical History


Past Medical History: COPD, Deep Vein Thrombosis (DVT), GERD/Reflux, 

Hyperlipidemia, Hypertension, Pneumonia, Sleep Apnea/CPAP/BIPAP


Additional Past Medical History / Comment(s): COVID IN NOVEMBER 2021.  CHRONIC 

ABD PAIN HAS NOT FOUND DEFINITIVE CAUSE but has a abdominal dilaudid pain pump. 

Hx. of Diverticulitis, Pneumonia (2018),  oxygen @ 5L ATC,  chronic back pain,  

uses CPAP, hx. DVT RT leg , neuropathy feet, hx of pancreatitis.bilateral 

cataracts removed


History of Any Multi-Drug Resistant Organisms: None Reported


Past Surgical History: Appendectomy, Bowel Resection, Cholecystectomy


Additional Past Surgical History / Comment(s): BOWEL RESECTION.  Colonoscopy, 

exp. laparotomy, EGD, LASIK EYE SURGERY, pain pump


Past Anesthesia/Blood Transfusion Reactions: No Reported Reaction


Past Psychological History: No Psychological Hx Reported


Smoking Status: Former smoker


Past Alcohol Use History: Rare


Additional Past Alcohol Use History / Comment(s): quit smoking 2001, smoked 1-

2ppd since age of 18


Past Drug Use History: None Reported





- Past Family History


  ** Brother(s)


Family Medical History: Cancer


Additional Family Medical History / Comment(s): Colon





Medications and Allergies


                                Home Medications











 Medication  Instructions  Recorded  Confirmed  Type


 


Spironolactone [Aldactone] 25 mg PO DAILY 11/23/15 04/20/23 History


 


Furosemide [Lasix] 40 mg PO DAILY 12/03/19 04/20/23 History


 


Omeprazole 40 mg PO DAILY 05/25/22 04/20/23 History


 


lisinopriL [Zestril] 5 mg PO DAILY 05/25/22 04/20/23 History


 


oxyCODONE-APAP 10-325MG [Percocet 1 tab PO Q8H 05/25/22 04/20/23 History





 mg]    


 


tiZANidine HCL 4 mg PO BID PRN 05/25/22 04/20/23 History


 


Albuterol Sulfate [Albuterol 1 puff PO RT-BID 04/20/23 04/20/23 History





Sulfate Hfa]    


 


Atorvastatin [Lipitor] 20 mg PO HS 04/20/23 04/20/23 History


 


Dextroamphetamine/Amphetamine 30 mg PO BID@0900,1600 04/20/23 04/20/23 History





[Adderall]    


 


Dicyclomine [Bentyl] 20 mg PO QID 04/20/23 04/20/23 History


 


Ipratropium Nebulized [Atrovent 0.5 mg INHALATION RT-QID 04/20/23 04/20/23 

History





Nebulized 0.2 MG/ML]    


 


Isosorbide Dinitrate 30 mg PO DAILY 04/20/23 04/20/23 History








                                    Allergies











Allergy/AdvReac Type Severity Reaction Status Date / Time


 


bee pollen Allergy  Anaphylaxis Verified 04/20/23 15:21


 


Iodinated Contrast Media Allergy  Anaphylaxis Verified 04/20/23 15:21





[Iodinated Contrast Media -     





IV Dye]     


 


metformin AdvReac  Abdominal Verified 04/20/23 15:21





   Pain  














Physical Exam


Vitals: 


                                   Vital Signs











  Temp Pulse Pulse Resp BP BP Pulse Ox


 


 04/21/23 06:34  98.4 F   58 L  15   106/69  95


 


 04/21/23 02:50  98.2 F   51 L  17   97/56  97


 


 04/20/23 18:57   68     


 


 04/20/23 18:49   64     


 


 04/20/23 18:48  97.7 F   71  18   128/81  95


 


 04/20/23 18:05   69   18  110/77   98


 


 04/20/23 15:29   92   18  102/63   98


 


 04/20/23 13:31  97.9 F  91   20  111/72   93 L








                                Intake and Output











 04/20/23 04/21/23 04/21/23





 22:59 06:59 14:59


 


Other:   


 


  # Voids 1 1 


 


  Weight 109.769 kg  














Results





                                 04/20/23 14:02





                                 04/20/23 14:02


                                 Cardiac Enzymes











  04/20/23 04/20/23 04/20/23 Range/Units





  14:02 14:02 18:55 


 


AST  23    (17-59)  U/L


 


Troponin I   <0.012  <0.012  (0.000-0.034)  ng/mL














  04/20/23 Range/Units





  22:47 


 


AST   (17-59)  U/L


 


Troponin I  <0.012  (0.000-0.034)  ng/mL








                                   Coagulation











  04/20/23 Range/Units





  14:02 


 


PT  10.8  (9.0-12.0)  sec


 


APTT  24.2  (22.0-30.0)  sec








                                       CBC











  04/20/23 Range/Units





  14:02 


 


WBC  7.0  (3.8-10.6)  k/uL


 


RBC  4.56  (4.30-5.90)  m/uL


 


Hgb  14.2  (13.0-17.5)  gm/dL


 


Hct  42.6  (39.0-53.0)  %


 


Plt Count  208  (150-450)  k/uL








                          Comprehensive Metabolic Panel











  04/20/23 Range/Units





  14:02 


 


Sodium  136 L  (137-145)  mmol/L


 


Potassium  4.4  (3.5-5.1)  mmol/L


 


Chloride  100  ()  mmol/L


 


Carbon Dioxide  29  (22-30)  mmol/L


 


BUN  17  (9-20)  mg/dL


 


Creatinine  0.45 L  (0.66-1.25)  mg/dL


 


Glucose  263 H  (74-99)  mg/dL


 


Calcium  8.6  (8.4-10.2)  mg/dL


 


AST  23  (17-59)  U/L


 


ALT  19  (4-49)  U/L


 


Alkaline Phosphatase  72  ()  U/L


 


Total Protein  6.6  (6.3-8.2)  g/dL


 


Albumin  3.7  (3.5-5.0)  g/dL








                               Current Medications











Generic Name Dose Route Start Last Admin





  Trade Name Freq  PRN Reason Stop Dose Admin


 


Aspirin  325 mg  04/21/23 09:00 





  Aspirin 325 Mg Tab  PO  





  DAILY [facility]  


 


Atorvastatin Calcium  20 mg  04/20/23 21:00  04/20/23 21:22





  Atorvastatin 20 Mg Tab  PO   20 mg





  HS BERRY   Administration


 


Dicyclomine HCl  20 mg  04/20/23 18:00  04/20/23 22:02





  Dicyclomine 20 Mg Tab  PO   20 mg





  QID BERRY   Administration


 


Furosemide  40 mg  04/21/23 09:00 





  Furosemide 40 Mg Tab  PO  





  DAILY [facility]  


 


Heparin Sodium (Porcine)  5,000 unit  04/20/23 17:45  04/21/23 00:07





  Heparin Sodium,Porcine/Pf 5,000 Unit/0.5 Ml Syringe  SQ   5,000 unit





  Q8HR BERRY   Administration


 


Ipratropium Bromide  0.5 mg  04/20/23 20:00  04/20/23 18:49





  Ipratropium 0.5 Mg/2.5 Ml Nebu  INHALATION   0.5 mg





  RT-QID BERRY   Administration


 


Isosorbide Mononitrate  30 mg  04/21/23 09:00 





  Isosorbide Mononitrate Er 30 Mg Tab.Er.24h  PO  





  DAILY [facility]  


 


Lisinopril  5 mg  04/21/23 09:00 





  Lisinopril 5 Mg Tab  PO  





  DAILY [facility]  


 


Nitroglycerin  0.4 mg  04/20/23 16:29 





  Nitroglycerin Sl Tabs 0.4 Mg Tab  SUBLINGUAL  





  Q5M PRN  





  Chest Pain  


 


Non-Formulary Medication  30 mg  04/21/23 09:00 





  Dextroamphetamine/Amphetamine [Adderall]  PO  





  BID@0900,1600 [facility]  


 


Oxycodone/Acetaminophen  1 each  04/21/23 00:00  04/20/23 22:00





  Oxycodone-Apap 10-325mg 1 Each Tab  PO   1 each





  Q8HR BERRY   Administration


 


Pantoprazole Sodium  40 mg  04/21/23 09:00 





  Pantoprazole 40 Mg Tablet  PO  





  DAILY BERRY  


 


Spironolactone  25 mg  04/21/23 09:00 





  Spironolactone 25 Mg Tab  PO  





  DAILY BERRY  


 


Tizanidine HCl  4 mg  04/20/23 17:25 





  Tizanidine 4 Mg Tab  PO  





  BID PRN  





  Pain  








                                Intake and Output











 04/20/23 04/21/23 04/21/23





 22:59 06:59 14:59


 


Other:   


 


  # Voids 1 1 


 


  Weight 109.769 kg  








                                        





                                 04/20/23 14:02 





                                 04/20/23 14:02

## 2023-04-22 LAB
GLUCOSE BLD-MCNC: 152 MG/DL (ref 70–110)
GLUCOSE BLD-MCNC: 159 MG/DL (ref 70–110)
GLUCOSE BLD-MCNC: 213 MG/DL (ref 70–110)
GLUCOSE BLD-MCNC: 257 MG/DL (ref 70–110)

## 2023-04-22 RX ADMIN — INSULIN ASPART SCH UNIT: 100 INJECTION, SOLUTION INTRAVENOUS; SUBCUTANEOUS at 12:46

## 2023-04-22 RX ADMIN — ATORVASTATIN CALCIUM SCH MG: 40 TABLET, FILM COATED ORAL at 20:09

## 2023-04-22 RX ADMIN — ASPIRIN SCH: 325 TABLET ORAL at 09:23

## 2023-04-22 RX ADMIN — INSULIN ASPART SCH UNIT: 100 INJECTION, SOLUTION INTRAVENOUS; SUBCUTANEOUS at 06:15

## 2023-04-22 RX ADMIN — INSULIN ASPART SCH UNIT: 100 INJECTION, SOLUTION INTRAVENOUS; SUBCUTANEOUS at 18:09

## 2023-04-22 RX ADMIN — HEPARIN SODIUM SCH UNIT: 5000 INJECTION INTRAVENOUS; SUBCUTANEOUS at 15:56

## 2023-04-22 RX ADMIN — IPRATROPIUM BROMIDE SCH MG: 0.5 SOLUTION RESPIRATORY (INHALATION) at 11:18

## 2023-04-22 RX ADMIN — ISOSORBIDE MONONITRATE SCH MG: 30 TABLET, EXTENDED RELEASE ORAL at 09:22

## 2023-04-22 RX ADMIN — OXYCODONE HYDROCHLORIDE AND ACETAMINOPHEN SCH EACH: 10; 325 TABLET ORAL at 23:07

## 2023-04-22 RX ADMIN — DICYCLOMINE HYDROCHLORIDE SCH MG: 20 TABLET ORAL at 13:55

## 2023-04-22 RX ADMIN — PANTOPRAZOLE SODIUM SCH MG: 40 TABLET, DELAYED RELEASE ORAL at 09:22

## 2023-04-22 RX ADMIN — ASPIRIN SCH: 325 TABLET ORAL at 15:56

## 2023-04-22 RX ADMIN — POTASSIUM CHLORIDE SCH: 14.9 INJECTION, SOLUTION INTRAVENOUS at 20:08

## 2023-04-22 RX ADMIN — HEPARIN SODIUM SCH UNIT: 5000 INJECTION INTRAVENOUS; SUBCUTANEOUS at 23:07

## 2023-04-22 RX ADMIN — IPRATROPIUM BROMIDE SCH MG: 0.5 SOLUTION RESPIRATORY (INHALATION) at 09:05

## 2023-04-22 RX ADMIN — FUROSEMIDE SCH MG: 40 TABLET ORAL at 09:23

## 2023-04-22 RX ADMIN — DICYCLOMINE HYDROCHLORIDE SCH MG: 20 TABLET ORAL at 09:22

## 2023-04-22 RX ADMIN — SPIRONOLACTONE SCH MG: 25 TABLET, FILM COATED ORAL at 09:23

## 2023-04-22 RX ADMIN — OXYCODONE HYDROCHLORIDE AND ACETAMINOPHEN SCH EACH: 10; 325 TABLET ORAL at 15:55

## 2023-04-22 RX ADMIN — ASPIRIN 81 MG CHEWABLE TABLET SCH MG: 81 TABLET CHEWABLE at 09:23

## 2023-04-22 RX ADMIN — HEPARIN SODIUM SCH UNIT: 5000 INJECTION INTRAVENOUS; SUBCUTANEOUS at 09:21

## 2023-04-22 RX ADMIN — OXYCODONE HYDROCHLORIDE AND ACETAMINOPHEN SCH EACH: 10; 325 TABLET ORAL at 09:22

## 2023-04-22 NOTE — P.PN
Progress Note - Text


Progress Note Date: 04/22/23





Patient's resting comfortably in his bed.  He denies any significant abdominal 

pain.  He is tolerating most of his breakfast.  He denies any dysphagia.





On exam vital signs are stable.  Abdomen soft..  Patient can receive supportive 

care.

## 2023-04-22 NOTE — P.PN
Subjective


Progress Note Date: 04/22/23


Principal diagnosis: 





Diagnosis:


#1 acute chest pain substernal across the chest


#2 normal troponin and EKG


#3 cardiology consult indicating noncardiac with the unknown etiology.


#4 consultation with pulmonary and critical for unknown etiology of chest 

discomfort which is progressively increased


#5 pulmonary consult reviewed indicating stable COPD, and the reason for the 

consult there is infiltrate in the lower basis as well as the [facility] testing of swallow study was not completed because of finding of silent 

aspiration.


#6 pulmonary consult sign off.  However with increased chest pain and chest 

discomfort 8/10 today on 04/22/2023.  Consult with pulmonary requested needed 

for unknown chest pain


#7 patient had history of silent aspiration underwent swallow study with the 

finding indicating penetration with no aspiration done on yesterday 21st of April 2023 and recommended speech pathology evaluation which is consult.


#8 Dr. Martin did see the patient in consultation with the history of 

significant adhesion as well as right right lower quadrant discomfort was O 

patient and he was in the past scheduled for removing the adhesion.


Dr. Martin scheduled him for Monday for EGD evaluation.


Patient has COPD and chronic hypoxemia on permanent oxygen at home 3-4 L.


#9 diabetes mellitus type 2 and hemoglobin A1c 8.2 with fluctuation currently on

insulin to scale with the ALLERGY to metformin.





Progress note


Date of service for/22/2023


Dictation by Dr. Lopez.





Today I did discuss it with the patient in detail with his wife.


Patient complaining of chest burning and pain across the chest 8/10 and 

subsequently his nurse paged me on perfect serve and I did, and evaluated the 

patient


And I did message the nurse back to contact cardiology, pulmonary, the surgeon 

will be planned for EGD on Monday.





I reviewed the note from the pulmonary and critical care and they signed off 

considering the patient was stable on the note dated 04/21/2023 however we will 

be still contacting them to see if there is any help of the underlying 8/10 

chest pain.


Also they contacted the cardiology who stated that the pain is noncardiac and no

farther diagnostic or treatment for the considering unknown chest pain.


Plan I did start updraft nebulizer with albuterol and ipratropium 4 times a day 

and discontinue the ipratropium.


Also I did review the medication and discontinuation of the medication that has 

any relation at this time and to exclude any possibility of any drug reaction 

including Bentyl discontinued and will be checking CMP and CBC with differential

in a.m.  And continue consultation with the speech pathology and meanwhile Dr. Mcelroy oral upper endoscopy on Monday a.m.





His temperature 98.3 F oral, pulse 73/m regular, he has increased anteroposteri

or diameter, he has decreased air entry on the basis, blood pressure 113/74 and 

mean 87 he is on nasal cannula 3 L with pulse ox 96%.





Hemoglobin A1c 8.2 with the blood sugar fluctuation and the POC blood glucose 

was a lost 1 213.


On exam: Patient is conscious alert oriented 3 his wife at bedside.


Head was normocephalic and atraumatic.  Oropharynx he has only a few teeth in 

the right lower jaw no facial symmetry.


Neck was supple no JVD no thyromegaly no lymphadenopathy trachea midline.  Neck 

was supple no JVD no thyromegaly no lymphadenopathy.


Chest was increased anteroposterior diameter decreased air entry on the basis he

had scattered expiratory rhonchi's with the COPD


The heart was regular sinus rhythm and will obtain the last echocardiogram.


Abdomen positive bowel sounds multiple scar of the abdomen and he had also 

history of adhesion in the past as well as no tenderness on the costochondral 

junction of the chest wall


Extremities no edema and positive pulses with good perfusion


Neurologically stable.


Psychologically stable.





Assessment:


#1 still acute chest pain with exacerbation was 8/10 today and nitroglycerin 

sublingual did not help as he received it on the floor.


#2 and then etiology of his chest discomfort and we asked of the pulmonary as 

well as cardiology for any help or opinion or any sign.  Decrease in.


#3 diabetes mellitus type 2 with elevated hemoglobin A1c.


#4 history of silent aspiration and workup is in progress.


#5 hyperlipidemia.





Plan


We will continue symptomatic treatment as well as requesting help from 

cardiology pulmonary.


Dr. lim although the EGD on Monday.


Patient has chronic pain syndrome with Dilor the pump in the right mid abdomen 

as well as he is on Percocet stated that different pain in his chest of unknown 

etiology





Objective





- Vital Signs


Vital signs: 


                                   Vital Signs











Temp  98.3 F   04/22/23 07:00


 


Pulse  78   04/22/23 11:26


 


Resp  18   04/22/23 07:00


 


BP  113/74   04/22/23 07:00


 


Pulse Ox  95   04/22/23 09:07


 


FiO2      








                                 Intake & Output











 04/21/23 04/22/23 04/22/23





 18:59 06:59 18:59


 


Intake Total 298  118


 


Balance 298  118


 


Intake:   


 


  Oral 298  118


 


Other:   


 


  Voiding Method Toilet Toilet 


 


  # Voids 1 2 














- Labs


CBC & Chem 7: 


                                 04/20/23 14:02





                                 04/20/23 14:02


Labs: 


                  Abnormal Lab Results - Last 24 Hours (Table)











  04/21/23 04/21/23 04/22/23 Range/Units





  17:21 20:37 05:22 


 


POC Glucose (mg/dL)  146 H  228 H   ()  mg/dL


 


Hemoglobin A1c    8.2 H  (0.0-6.0)  %














  04/22/23 04/22/23 Range/Units





  06:03 12:27 


 


POC Glucose (mg/dL)  159 H  213 H  ()  mg/dL


 


Hemoglobin A1c    (0.0-6.0)  %








                      Microbiology - Last 24 Hours (Table)











 04/20/23 14:20 Blood Culture - Preliminary





 Blood

## 2023-04-23 LAB
GLUCOSE BLD-MCNC: 141 MG/DL (ref 70–110)
GLUCOSE BLD-MCNC: 164 MG/DL (ref 70–110)
GLUCOSE BLD-MCNC: 165 MG/DL (ref 70–110)
GLUCOSE BLD-MCNC: 361 MG/DL (ref 70–110)

## 2023-04-23 RX ADMIN — INSULIN ASPART SCH: 100 INJECTION, SOLUTION INTRAVENOUS; SUBCUTANEOUS at 17:33

## 2023-04-23 RX ADMIN — HEPARIN SODIUM SCH UNIT: 5000 INJECTION INTRAVENOUS; SUBCUTANEOUS at 16:57

## 2023-04-23 RX ADMIN — ASPIRIN 81 MG CHEWABLE TABLET SCH MG: 81 TABLET CHEWABLE at 08:39

## 2023-04-23 RX ADMIN — FUROSEMIDE SCH MG: 40 TABLET ORAL at 08:39

## 2023-04-23 RX ADMIN — POTASSIUM CHLORIDE SCH: 14.9 INJECTION, SOLUTION INTRAVENOUS at 19:57

## 2023-04-23 RX ADMIN — ISOSORBIDE MONONITRATE SCH MG: 30 TABLET, EXTENDED RELEASE ORAL at 08:39

## 2023-04-23 RX ADMIN — ATORVASTATIN CALCIUM SCH MG: 40 TABLET, FILM COATED ORAL at 20:01

## 2023-04-23 RX ADMIN — HEPARIN SODIUM SCH UNIT: 5000 INJECTION INTRAVENOUS; SUBCUTANEOUS at 08:38

## 2023-04-23 RX ADMIN — OXYCODONE HYDROCHLORIDE AND ACETAMINOPHEN SCH EACH: 10; 325 TABLET ORAL at 16:56

## 2023-04-23 RX ADMIN — IPRATROPIUM BROMIDE AND ALBUTEROL SULFATE PRN ML: .5; 3 SOLUTION RESPIRATORY (INHALATION) at 19:57

## 2023-04-23 RX ADMIN — ASPIRIN SCH: 325 TABLET ORAL at 08:39

## 2023-04-23 RX ADMIN — ASPIRIN SCH: 325 TABLET ORAL at 16:57

## 2023-04-23 RX ADMIN — IPRATROPIUM BROMIDE AND ALBUTEROL SULFATE PRN ML: .5; 3 SOLUTION RESPIRATORY (INHALATION) at 09:30

## 2023-04-23 RX ADMIN — INSULIN ASPART SCH UNIT: 100 INJECTION, SOLUTION INTRAVENOUS; SUBCUTANEOUS at 13:05

## 2023-04-23 RX ADMIN — OXYCODONE HYDROCHLORIDE AND ACETAMINOPHEN SCH EACH: 10; 325 TABLET ORAL at 08:38

## 2023-04-23 RX ADMIN — INSULIN ASPART SCH UNIT: 100 INJECTION, SOLUTION INTRAVENOUS; SUBCUTANEOUS at 05:39

## 2023-04-23 RX ADMIN — PANTOPRAZOLE SODIUM SCH MG: 40 TABLET, DELAYED RELEASE ORAL at 08:39

## 2023-04-23 NOTE — P.PN
Subjective


Progress Note Date: 04/23/23 (Waiting for EGD tomorrow)





Objective





- Vital Signs


Vital signs: 


                                   Vital Signs











Temp  98.1 F   04/23/23 07:00


 


Pulse  77   04/23/23 09:39


 


Resp  16   04/23/23 07:00


 


BP  136/74   04/23/23 07:00


 


Pulse Ox  96   04/23/23 09:32


 


FiO2      








                                 Intake & Output











 04/22/23 04/23/23 04/23/23





 18:59 06:59 18:59


 


Intake Total 354  


 


Balance 354  


 


Intake:   


 


  Oral 354  


 


Other:   


 


  Voiding Method  Toilet 


 


  # Voids 2 1 














- Labs


CBC & Chem 7: 


                                 04/20/23 14:02





                                 04/20/23 14:02


Labs: 


                  Abnormal Lab Results - Last 24 Hours (Table)











  04/22/23 04/22/23 04/23/23 Range/Units





  17:32 20:44 05:35 


 


POC Glucose (mg/dL)  152 H  257 H  164 H  ()  mg/dL














  04/23/23 Range/Units





  12:05 


 


POC Glucose (mg/dL)  361 H  ()  mg/dL








                      Microbiology - Last 24 Hours (Table)











 04/20/23 14:20 Blood Culture - Preliminary





 Blood

## 2023-04-23 NOTE — P.PN
Subjective


Progress Note Date: 04/23/23 (Waiting for EGD tomorrow by Dr. Flores.)





Progress note


Date of service 04/23/2023 dictation by Dr. Hayward


Patient seen in face-to-face discussed with the patient and his wife in detail.


Complain:


Still has the chest discomfort 78/10 across the chest and does not resolved by 

nitroglycerin as well as no radiation to the neck


Pulmonary and critical care sign off


Cardiology also sign off with the understanding of the pain non-cardiac however 

pain is unknown etiology still present with continuous.


Patient had previous history of stricture of the esophagus and silent aspiration

by the test done [facility]

however video barium swallow done on 4/20 01/20/2023 and the impression


Deep.  Filtration with less viscous modalities, no aspiration observed and 

rifampin to address patient therapist.


Consultation with the speech therapy requested however is not available on the 

weekends probably will see her tomorrow


Dr. Flores scheduled the patient for EGD tomorrow with these underlying 

history of stricture and dilatation of the lower esophagus in the past as well 

and dilatation if this needed and if it's possible relieving his chest 

discomfort as the cardiology specialist and pulmonary specialist indicating no 

relation to the system and they don't know the etiology


Patient still had the pain across the chest and he stated that this moderately 

severe between 7-8/10 and not resolving


Patient had chronic pain syndrome and he had a pump in the right mid abdomen 

with the Dilantin as well as he has supplemented with Percocet.  The pain clinic

Dr. [facility].


On exam patient is conscious alert oriented his wife at bedside


Temperature 98.1 F oral


Heart rate ranging between 80-77 bpm regular sinus


His blood pressure 136/74 mean 94.


Patient on 4 L of oxygen nasal cannula with the pulse ox 96%, patient has 

chronic respiratory failure on home oxygen was prescribed by pulmonary Dr. Padilla.


Patient has history of COPD chronically with the chronic respiratory failure.





On exam:


Conscious alert oriented


Normocephalic atraumatic


Oropharynx missing teeth daily's only on the right lower jaw


Neck was supple no JVD no thyromegaly no lymphadenopathy


He had hyperinflation of the lung with the COPD and asked symmetrical chest 

decrease air entry on the basis.


Abdomen: Obese positive bowel sounds multiple scarring


Extremities no edema history of mild varicosities and pulses is intact new


Mild anxiety stable


No history of strokes in the past





Assessment:


Persistent chest discomfort and pain with substernal with quick squeezing in Her

continuous does not resolve even with the dysphagia diet and nitroglycerin does 

not help at.


COPD


History of chronic respiratory failure.  On permanent oxygen at home


Obesity


Cardiology sign out and pulmonary signed out.


Diabetes mellitus2 with hyperglycemia P currently on insulin to scale, metformin

causing him diarrhea and abdominal distention.





Plan:


#1 will wait for Dr. Flores for her EGD and evaluation of the aspiration as 

he was diagnosed in the [facility] with the silent aspiration as well

as if there is any stricture causing these discomfort


#2 continue the current treatment.





Objective





- Vital Signs


Vital signs: 


                                   Vital Signs











Temp  98.1 F   04/23/23 07:00


 


Pulse  77   04/23/23 09:39


 


Resp  16   04/23/23 07:00


 


BP  136/74   04/23/23 07:00


 


Pulse Ox  96   04/23/23 09:32


 


FiO2      








                                 Intake & Output











 04/22/23 04/23/23 04/23/23





 18:59 06:59 18:59


 


Intake Total 354  


 


Balance 354  


 


Intake:   


 


  Oral 354  


 


Other:   


 


  Voiding Method  Toilet 


 


  # Voids 2 1 














- Labs


CBC & Chem 7: 


                                 04/20/23 14:02





                                 04/20/23 14:02


Labs: 


                  Abnormal Lab Results - Last 24 Hours (Table)











  04/22/23 04/22/23 04/23/23 Range/Units





  17:32 20:44 05:35 


 


POC Glucose (mg/dL)  152 H  257 H  164 H  ()  mg/dL














  04/23/23 Range/Units





  12:05 


 


POC Glucose (mg/dL)  361 H  ()  mg/dL








                      Microbiology - Last 24 Hours (Table)











 04/20/23 14:20 Blood Culture - Preliminary





 Blood

## 2023-04-23 NOTE — P.PN
Progress Note - Text


Progress Note Date: 04/23/23


Patient remained stable.  He still has some dysphagia.  Patient scheduled for 

EGD in the a.m.





On exam vital signs are stable.  Abdomen soft nontender

## 2023-04-24 VITALS — TEMPERATURE: 98.2 F

## 2023-04-24 VITALS — SYSTOLIC BLOOD PRESSURE: 105 MMHG | HEART RATE: 71 BPM | DIASTOLIC BLOOD PRESSURE: 65 MMHG

## 2023-04-24 VITALS — RESPIRATION RATE: 18 BRPM

## 2023-04-24 LAB
GLUCOSE BLD-MCNC: 163 MG/DL (ref 70–110)
GLUCOSE BLD-MCNC: 211 MG/DL (ref 70–110)

## 2023-04-24 RX ADMIN — HEPARIN SODIUM SCH UNIT: 5000 INJECTION INTRAVENOUS; SUBCUTANEOUS at 00:05

## 2023-04-24 RX ADMIN — ASPIRIN SCH: 325 TABLET ORAL at 09:12

## 2023-04-24 RX ADMIN — INSULIN ASPART SCH: 100 INJECTION, SOLUTION INTRAVENOUS; SUBCUTANEOUS at 05:36

## 2023-04-24 RX ADMIN — FUROSEMIDE SCH MG: 40 TABLET ORAL at 09:08

## 2023-04-24 RX ADMIN — OXYCODONE HYDROCHLORIDE AND ACETAMINOPHEN SCH: 10; 325 TABLET ORAL at 09:11

## 2023-04-24 RX ADMIN — HEPARIN SODIUM SCH: 5000 INJECTION INTRAVENOUS; SUBCUTANEOUS at 09:07

## 2023-04-24 RX ADMIN — ASPIRIN 81 MG CHEWABLE TABLET SCH MG: 81 TABLET CHEWABLE at 09:08

## 2023-04-24 RX ADMIN — OXYCODONE HYDROCHLORIDE AND ACETAMINOPHEN SCH EACH: 10; 325 TABLET ORAL at 00:05

## 2023-04-24 RX ADMIN — INSULIN ASPART SCH UNIT: 100 INJECTION, SOLUTION INTRAVENOUS; SUBCUTANEOUS at 12:23

## 2023-04-24 RX ADMIN — ISOSORBIDE MONONITRATE SCH MG: 30 TABLET, EXTENDED RELEASE ORAL at 09:08

## 2023-04-24 RX ADMIN — PANTOPRAZOLE SODIUM SCH MG: 40 TABLET, DELAYED RELEASE ORAL at 09:08

## 2023-04-24 NOTE — P.DS
Providers


Date of admission: 


04/20/23 16:30





Expected date of discharge: 04/24/23


Attending physician: 


Isac Hayward





Consults: 





                                        





04/20/23 16:29


Consult Physician Urgent 


   Consulting Provider: [facility]


   Consult Reason/Comments: Chest pain, dyspnea


   Do you want consulting provider notified?: Yes





04/21/23 07:32


Consult Physician Routine 


   Consulting Provider: Sydnee Hoskins


   Consult Reason/Comments: CXR basilar infiltrates


   Do you want consulting provider notified?: Yes





04/21/23 10:36


Consult Physician Routine 


   Consulting Provider: Yamila Martin


   Consult Reason/Comments: upper abdominal pain, aspiration during esophagram


   Do you want consulting provider notified?: Yes











Primary care physician: 


Isac Hayward


Dictation discharge summary


Date of service 04/24/2023


Dictation by Dr. Lopez.





Final diagnoses:


#1 chest pain with admission across the chest with normal EKG and troponin


#2 consultation with pulmonary and cardiology with the noncardiac chest pain.


#3 EGD with the finding by Dr. Martin on today 04/24/2023


#4 chronic respiratory failure with the underlying COPD and interstitial lung 

disease on the basis.  Pulmonary.


#5 chronic pain syndrome with right mid abdomen Dilaudid-HP pump and monitored 

by Dr. P[facility] pain clinic.


Number a upper esophageal stenosis dilated with 75-Croatian rigid dilator


Number be diaphragmatic hiatus is at 42 cm from the incisor


Squamous columnar junction 42 cm from the incisor


Gastric body and fundus cold forceps biopsies obtained


LA grade a erosive esophagitis


He'll grade 1 lower esophageal valve


Distal esophageal spasm with the recommendation omeprazole 40 mg daily warm 

beverage prior to eating for esophageal spasm.





Patient seen and evaluated today face-to-face


His chest discomfort has been resolved completely.  And no pain or swallowing.  

Feeling great and he is ready to go home will have a clearance from Dr. Martin before discharge.





Exam on discharge his vital signs stable and afebrile pulse 71 bpm regular 

sinus, respiratory rate 18/m, blood pressure 105/65 with a mean 78 and nasal 

cannula 4 L with chronic respiratory failure pulse ox 97


Patient is conscious alert oriented 3 head was normocephalic and atraumatic


Oropharynx normal swallowing


Neck was supple no JVD no thyromegaly no lymphadenopathy


Chest clear to auscultation percussion with increased anteroposterior diameter 

with history of COPD and the pain has been resolved completely.


The heart regular sinus rhythm


The abdomen soft positive bowel sound no pain


Extremities no edema and positive pulses.


Psychiatry stable


Neurology stable





Assessment patient stable general condition to be discharged home today to be 

followed as outpatient in 3 days in the office and also followed by Dr. Lowery as outpatient.


Continue home medication and the pain medication by his pain physician


Follow-up with Dr. Padilla and on when necessary basis follow-up with 

cardiology on a when necessary basis.  Patient to continue omeprazole 40 mg once

a day a.m. her advise Dr. Martin.





Plan - Discharge Summary


New Discharge Prescriptions: 


New


   INSULIN ASPART (NovoLOG) [NovoLOG (formulary)] 5 unit SQ AC-TID #10 ml





Continue


   Spironolactone [Aldactone] 25 mg PO DAILY


   tiZANidine HCL 4 mg PO BID PRN


     PRN Reason: Pain


   lisinopriL [Zestril] 5 mg PO DAILY


   Atorvastatin [Lipitor] 20 mg PO HS


   oxyCODONE-APAP 10-325MG [Percocet  mg] 1 tab PO Q8H


   Ipratropium Nebulized [Atrovent Nebulized 0.2 MG/ML] 0.5 mg INHALATION RT-QID


   Dextroamphetamine/Amphetamine [Adderall] 30 mg PO BID@0900,1600


   Isosorbide Dinitrate 30 mg PO DAILY





Discontinued


   Furosemide [Lasix] 40 mg PO DAILY


   Dicyclomine [Bentyl] 20 mg PO QID





No Action


   Omeprazole 40 mg PO DAILY


   Albuterol Sulfate [Albuterol Sulfate Hfa] 1 puff PO RT-BID


Discharge Medication List





Spironolactone [Aldactone] 25 mg PO DAILY 11/23/15 [History]


Omeprazole 40 mg PO DAILY 05/25/22 [History]


lisinopriL [Zestril] 5 mg PO DAILY 05/25/22 [History]


oxyCODONE-APAP 10-325MG [Percocet  mg] 1 tab PO Q8H 05/25/22 [History]


tiZANidine HCL 4 mg PO BID PRN 05/25/22 [History]


Albuterol Sulfate [Albuterol Sulfate Hfa] 1 puff PO RT-BID 04/20/23 [History]


Atorvastatin [Lipitor] 20 mg PO HS 04/20/23 [History]


Dextroamphetamine/Amphetamine [Adderall] 30 mg PO BID@0900,1600 04/20/23 

[History]


Ipratropium Nebulized [Atrovent Nebulized 0.2 MG/ML] 0.5 mg INHALATION RT-QID 

04/20/23 [History]


Isosorbide Dinitrate 30 mg PO DAILY 04/20/23 [History]


INSULIN ASPART (NovoLOG) [NovoLOG (formulary)] 5 unit SQ AC-TID #10 ml 04/24/23 

[Rx]








Follow up Appointment(s)/Referral(s): 


Isac Hayward MD [Primary Care Provider] - 1-2 days

## 2023-04-24 NOTE — P.PCN
Date of Procedure: 04/24/23


Description of Procedure: 














PREOPERATIVE DIAGNOSIS:


Aspiration pneumonia


Dysphagia


Atypical chest pain





POSTOPERATIVE DIAGNOSIS:


Lower esophageal spasm with stenosis 


Dysphagia


Atypical chest pain





OPERATION:


Esophagogastroduodenoscopy with rigid dilator over the guidewire 57 Fr with 

dilation 


Esophagogastroduodenoscopy with  cold forceps biopsies stomach/antrum





SURGEON: Yamila Martin MD





ANESTHESIA: MAC.





INDICATIONS:


The patient is a 72-year-old male who presents with a history of gastroesophage

al reflux disease with abdominal pain. Benefits and risks of the procedure were 

described. Informed consent was obtained.





DESCRIPTION:


The patient was brought into the endoscopy suite and laid in the left lateral 

decubitus position.  After a timeout was confirmed, the procedure was initiated.




An Olympus gastroscope was passed into the posterior oropharynx where an upper 

esophageal stenosis was identified.  The scope was passed down to the distal 

esophagus.  To address the lower esophageal stenosis, rigid dilator over 

guidewire was selected.





Next using an American rigid dilator, a guidewire was placed through the 

gastroscope.  Next the scope was withdrawn.  A 57-Frisian rigid American dilator 

was passed carefully along the posterior oropharynx to 45 cm and left in place 

for 2-3 minutes stretch. The dilator was withdrawn including the guidewire.  The

scope was reentered along the posterior oropharynx with no findings of full-

thickness tear of the upper esophageal sphincter.  Additional findings below.





Within the stomach, severe acute gastritis with gastric ulcerations were 

identified along the body of the stomach with cold forceps biopsies obtained.  

The large esophageal valve was evaluated with Hill grade 2 lower esophageal 

valve.  LA grade A erosive esophagitis was identified.





No full-thickness injury was encountered.  The GI tract was desufflated.  The 

patient tolerated the procedure well.  





FINDINGS:


Upper esophageal stenosis dilated 57-Frisian rigid dilator


Diaphragmatic hiatus at 42 cm from the incisors


Squamocolumnar junction 42 cm from the incisors.


Gastric body and fundus cold forceps biopsies obtained


LA grade A erosive esophagitis


Hill grade 1 lower esophageal valve.  


Distal esophageal spasm 





RECOMMENDATIONS:


Omeprazole 40 mg daily


Recommend warm beverages prior to eating for esophageal spasm

## 2023-05-18 ENCOUNTER — HOSPITAL ENCOUNTER (INPATIENT)
Dept: HOSPITAL 47 - EC | Age: 73
LOS: 2 days | Discharge: HOME | DRG: 336 | End: 2023-05-20
Attending: SURGERY | Admitting: SURGERY
Payer: MEDICARE

## 2023-05-18 VITALS — BODY MASS INDEX: 36.7 KG/M2

## 2023-05-18 DIAGNOSIS — Z79.899: ICD-10-CM

## 2023-05-18 DIAGNOSIS — Z28.311: ICD-10-CM

## 2023-05-18 DIAGNOSIS — M16.10: ICD-10-CM

## 2023-05-18 DIAGNOSIS — M47.9: ICD-10-CM

## 2023-05-18 DIAGNOSIS — Z86.16: ICD-10-CM

## 2023-05-18 DIAGNOSIS — J84.10: ICD-10-CM

## 2023-05-18 DIAGNOSIS — E11.40: ICD-10-CM

## 2023-05-18 DIAGNOSIS — E66.01: ICD-10-CM

## 2023-05-18 DIAGNOSIS — K56.50: ICD-10-CM

## 2023-05-18 DIAGNOSIS — Z91.041: ICD-10-CM

## 2023-05-18 DIAGNOSIS — I08.1: ICD-10-CM

## 2023-05-18 DIAGNOSIS — K76.0: ICD-10-CM

## 2023-05-18 DIAGNOSIS — Z88.8: ICD-10-CM

## 2023-05-18 DIAGNOSIS — K22.4: ICD-10-CM

## 2023-05-18 DIAGNOSIS — K46.0: Primary | ICD-10-CM

## 2023-05-18 DIAGNOSIS — G89.4: ICD-10-CM

## 2023-05-18 DIAGNOSIS — I11.0: ICD-10-CM

## 2023-05-18 DIAGNOSIS — Z99.81: ICD-10-CM

## 2023-05-18 DIAGNOSIS — Z87.891: ICD-10-CM

## 2023-05-18 DIAGNOSIS — K21.00: ICD-10-CM

## 2023-05-18 DIAGNOSIS — Z91.030: ICD-10-CM

## 2023-05-18 DIAGNOSIS — Z79.4: ICD-10-CM

## 2023-05-18 DIAGNOSIS — I71.40: ICD-10-CM

## 2023-05-18 DIAGNOSIS — Z87.01: ICD-10-CM

## 2023-05-18 DIAGNOSIS — J43.9: ICD-10-CM

## 2023-05-18 DIAGNOSIS — I71.60: ICD-10-CM

## 2023-05-18 DIAGNOSIS — Z86.718: ICD-10-CM

## 2023-05-18 DIAGNOSIS — E78.5: ICD-10-CM

## 2023-05-18 DIAGNOSIS — J96.10: ICD-10-CM

## 2023-05-18 DIAGNOSIS — I27.21: ICD-10-CM

## 2023-05-18 DIAGNOSIS — Z96.89: ICD-10-CM

## 2023-05-18 DIAGNOSIS — I25.5: ICD-10-CM

## 2023-05-18 DIAGNOSIS — I50.30: ICD-10-CM

## 2023-05-18 DIAGNOSIS — Z71.3: ICD-10-CM

## 2023-05-18 LAB
ALBUMIN SERPL-MCNC: 3.6 G/DL (ref 3.5–5)
ALP SERPL-CCNC: 83 U/L (ref 38–126)
ALT SERPL-CCNC: 19 U/L (ref 4–49)
ANION GAP SERPL CALC-SCNC: 8 MMOL/L
AST SERPL-CCNC: 22 U/L (ref 17–59)
BASOPHILS # BLD AUTO: 0 K/UL (ref 0–0.2)
BASOPHILS NFR BLD AUTO: 0 %
BUN SERPL-SCNC: 12 MG/DL (ref 9–20)
CALCIUM SPEC-MCNC: 8.7 MG/DL (ref 8.4–10.2)
CHLORIDE SERPL-SCNC: 99 MMOL/L (ref 98–107)
CO2 SERPL-SCNC: 31 MMOL/L (ref 22–30)
EOSINOPHIL # BLD AUTO: 0.2 K/UL (ref 0–0.7)
EOSINOPHIL NFR BLD AUTO: 2 %
ERYTHROCYTE [DISTWIDTH] IN BLOOD BY AUTOMATED COUNT: 4.5 M/UL (ref 4.3–5.9)
ERYTHROCYTE [DISTWIDTH] IN BLOOD: 13.1 % (ref 11.5–15.5)
GLUCOSE BLD-MCNC: 218 MG/DL (ref 70–110)
GLUCOSE SERPL-MCNC: 307 MG/DL (ref 74–99)
GLUCOSE UR QL: (no result)
HCT VFR BLD AUTO: 41.9 % (ref 39–53)
HGB BLD-MCNC: 13.8 GM/DL (ref 13–17.5)
LYMPHOCYTES # SPEC AUTO: 1.8 K/UL (ref 1–4.8)
LYMPHOCYTES NFR SPEC AUTO: 18 %
MCH RBC QN AUTO: 30.8 PG (ref 25–35)
MCHC RBC AUTO-ENTMCNC: 33.1 G/DL (ref 31–37)
MCV RBC AUTO: 93.1 FL (ref 80–100)
MONOCYTES # BLD AUTO: 0.5 K/UL (ref 0–1)
MONOCYTES NFR BLD AUTO: 5 %
NEUTROPHILS # BLD AUTO: 7.4 K/UL (ref 1.3–7.7)
NEUTROPHILS NFR BLD AUTO: 74 %
PH UR: 6.5 [PH] (ref 5–8)
PLATELET # BLD AUTO: 247 K/UL (ref 150–450)
POTASSIUM SERPL-SCNC: 3.7 MMOL/L (ref 3.5–5.1)
PROT SERPL-MCNC: 6.5 G/DL (ref 6.3–8.2)
SODIUM SERPL-SCNC: 138 MMOL/L (ref 137–145)
SP GR UR: 1.04 (ref 1–1.03)
UROBILINOGEN UR QL STRIP: 3 MG/DL (ref ?–2)
WBC # BLD AUTO: 10 K/UL (ref 3.8–10.6)

## 2023-05-18 PROCEDURE — 83036 HEMOGLOBIN GLYCOSYLATED A1C: CPT

## 2023-05-18 PROCEDURE — 83880 ASSAY OF NATRIURETIC PEPTIDE: CPT

## 2023-05-18 PROCEDURE — 71046 X-RAY EXAM CHEST 2 VIEWS: CPT

## 2023-05-18 PROCEDURE — 83605 ASSAY OF LACTIC ACID: CPT

## 2023-05-18 PROCEDURE — 85379 FIBRIN DEGRADATION QUANT: CPT

## 2023-05-18 PROCEDURE — 81003 URINALYSIS AUTO W/O SCOPE: CPT

## 2023-05-18 PROCEDURE — 85025 COMPLETE CBC W/AUTO DIFF WBC: CPT

## 2023-05-18 PROCEDURE — 86140 C-REACTIVE PROTEIN: CPT

## 2023-05-18 PROCEDURE — 94640 AIRWAY INHALATION TREATMENT: CPT

## 2023-05-18 PROCEDURE — 94760 N-INVAS EAR/PLS OXIMETRY 1: CPT

## 2023-05-18 PROCEDURE — 36415 COLL VENOUS BLD VENIPUNCTURE: CPT

## 2023-05-18 PROCEDURE — 71250 CT THORAX DX C-: CPT

## 2023-05-18 PROCEDURE — 84145 PROCALCITONIN (PCT): CPT

## 2023-05-18 PROCEDURE — 74176 CT ABD & PELVIS W/O CONTRAST: CPT

## 2023-05-18 PROCEDURE — 80053 COMPREHEN METABOLIC PANEL: CPT

## 2023-05-18 RX ADMIN — ALBUTEROL SULFATE SCH PUFF: 90 AEROSOL, METERED RESPIRATORY (INHALATION) at 20:27

## 2023-05-18 RX ADMIN — CEFAZOLIN SCH MLS/HR: 330 INJECTION, POWDER, FOR SOLUTION INTRAMUSCULAR; INTRAVENOUS at 19:40

## 2023-05-18 RX ADMIN — OXYCODONE HYDROCHLORIDE AND ACETAMINOPHEN SCH EACH: 10; 325 TABLET ORAL at 20:12

## 2023-05-18 RX ADMIN — ATORVASTATIN CALCIUM SCH MG: 20 TABLET, FILM COATED ORAL at 20:55

## 2023-05-18 RX ADMIN — IPRATROPIUM BROMIDE SCH MG: 0.5 SOLUTION RESPIRATORY (INHALATION) at 20:27

## 2023-05-18 NOTE — CT
EXAMINATION TYPE: CT abdomen pelvis wo con

CT DLP: 1172.4 mGycm, Automated exposure control for dose reduction was used.

 

DATE OF EXAM: 5/18/2023 4:45 PM

 

COMPARISON: CT abdomen pelvis most recent from  10/30/2019

 

CLINICAL INDICATION:Male, 72 years old with history of abdominal pain; right sided abdominal pain

 

TECHNIQUE:  Axial CT of the abdomen and pelvis. Sagittal and coronal reformats were created on a [employer] workstation. 

 

Contrast used: None

Oral contrast used: without Oral Contrast 

 

FINDINGS: 

LOWER CHEST: Left lower lung airspace opacities. Emphysema changes seen within the right lower lung p
redominantly the right middle lobe.

 

ABDOMEN

LIVER: Unremarkable

GALLBLADDER AND BILE DUCTS: The gallbladder is surgically absent.

PANCREAS: Unremarkable.

SPLEEN: Unremarkable.

ADRENAL GLANDS: Unremarkable.

KIDNEYS AND URETERS: No evidence of hydronephrosis or renal calculus. The ureters are unremarkable.  


 

PELVIS

BLADDER: Unremarkable

REPRODUCTIVE: Unremarkable.

 

ABDOMEN & PELVIS

STOMACH AND BOWEL: No evidence of bowel obstruction. Scattered colonic diverticula present. The appen
fifi is not well visualized there is streak artifact in the cecum secondary to Electronic device.

PERITONEUM/RETROPERITONEUM: No evidence of pneumoperitoneum or free fluid.

VASCULATURE: Moderate atherosclerotic calcifications are present throughout the abdominal aorta and i
ts branches. No evidence of aortic aneurysm. 

MUSCULOSKELETAL: No acute osseous abnormalities. Moderate disc degeneration changes are present throu
ghout the thoracolumbar spine. Electronic device distal tip terminates in the spine. There is straigh
tening of the spine. Multilevel disc bulging is present with at least moderate to severe spinal canal
 stenosis at L3-L4.

LYMPH NODES: No gross evidence for lymphadenopathy.

SOFT TISSUE/ABDOMINAL WALL: Electronic device is in the anterior abdominal wall on the right which re
sults in streak artifact.

 

IMPRESSION:

1.  No definitive right sided acute process. The appendix is not well appreciated there is electronic
 device which results in streak artifact and limits evaluation. No obstructive uropathy or renal calc
ulus. No evidence for bowel obstruction.

2.  Left lower lung airspace opacities correlate for pneumonia/aspiration.

3.  Moderate to severe L3-L4 spinal canal stenosis.

## 2023-05-18 NOTE — ED
Abdominal Pain HPI





- General


Chief Complaint: Abdominal Pain


Stated Complaint: JORDI


Time Seen by Provider: 05/18/23 15:42


Source: patient, family


Mode of arrival: wheelchair





- History of Present Illness


Initial Comments: 





This patient is 72-year-old man who presents with complaint of right-sided 

abdominal pain that he states feels identical to pain he had related to 

adhesions in the abdomen.  He states that he had surgery with Dr. Arthur last

year to have the adhesions removed but she told him she was not able to resolve 

all of them at that time.  Patient has had increasing pain over past days.  He 

has continued to pass flatus.  No vomiting but nausea.


MD Complaint: abdominal pain


-: days(s)


Location: LUQ, RUQ


Radiation: none


Severity: severe


Quality: aching


Consistency: constant


Improves With: nothing


Worsens With: nothing


Associated Symptoms: nausea





- Related Data


                                Home Medications











 Medication  Instructions  Recorded  Confirmed


 


Spironolactone [Aldactone] 25 mg PO DAILY 11/23/15 05/18/23


 


Omeprazole 40 mg PO DAILY 05/25/22 05/18/23


 


lisinopriL [Zestril] 5 mg PO DAILY 05/25/22 05/18/23


 


oxyCODONE-APAP 10-325MG [Percocet 1 tab PO Q8H 05/25/22 05/18/23





 mg]   


 


tiZANidine HCL 4 mg PO BID PRN 05/25/22 05/18/23


 


Albuterol Sulfate [Albuterol 2 puff PO RT-QID 04/20/23 05/18/23





Sulfate Hfa]   


 


Atorvastatin [Lipitor] 20 mg PO HS 04/20/23 05/18/23


 


Dextroamphetamine/Amphetamine 30 mg PO BID@0900,1600 04/20/23 05/18/23





[Adderall]   


 


Ipratropium Nebulized [Atrovent 0.5 mg INHALATION RT-QID 04/20/23 05/18/23





Nebulized 0.2 MG/ML]   


 


Isosorbide Dinitrate 30 mg PO DAILY 04/20/23 05/18/23


 


INSULIN ASPART (NovoLOG) [NovoLOG 5 unit SQ AC-TID PRN 05/18/23 05/18/23





(formulary)]   











                                    Allergies











Allergy/AdvReac Type Severity Reaction Status Date / Time


 


bee pollen Allergy  Anaphylaxis Verified 05/18/23 18:23


 


Iodinated Contrast Media Allergy  Anaphylaxis Verified 05/18/23 18:23





[Iodinated Contrast Media -     





IV Dye]     


 


metformin AdvReac  Abdominal Verified 05/18/23 18:23





   Pain  














Review of Systems


ROS Statement: 


Those systems with pertinent positive or pertinent negative responses have been 

documented in the HPI.





ROS Other: All systems not noted in ROS Statement are negative.


Constitutional: Denies: fever, chills


Respiratory: Reports: cough, dyspnea (Underlying COPD)


Cardiovascular: Reports: edema (Chronic).  Denies: chest pain, palpitations, 

syncope


Gastrointestinal: Reports: abdominal pain, nausea.  Denies: vomiting, diarrhea, 

constipation, melena, hematochezia


Genitourinary: Denies: dysuria, hematuria


Musculoskeletal: Denies: back pain


Skin: Denies: rash


Neurological: Denies: headache, weakness





Past Medical History


Past Medical History: COPD, Deep Vein Thrombosis (DVT), GERD/Reflux, Hyperlipid

emia, Hypertension, Pneumonia, Sleep Apnea/CPAP/BIPAP


Additional Past Medical History / Comment(s): COVID IN NOVEMBER 2021.  CHRONIC 

ABD PAIN HAS NOT FOUND DEFINITIVE CAUSE but has a abdominal dilaudid pain pump. 

Hx. of Diverticulitis, Pneumonia (2018),  oxygen @ 5L ATC,  chronic back pain,  

uses CPAP, hx. DVT RT leg , neuropathy feet, hx of pancreatitis.bilateral 

cataracts removed


History of Any Multi-Drug Resistant Organisms: None Reported


Past Surgical History: Appendectomy, Bowel Resection, Cholecystectomy


Additional Past Surgical History / Comment(s): BOWEL RESECTION.  Colonoscopy, 

exp. laparotomy, EGD, LASIK EYE SURGERY, pain pump


Past Anesthesia/Blood Transfusion Reactions: No Reported Reaction


Past Psychological History: No Psychological Hx Reported


Smoking Status: Former smoker


Past Alcohol Use History: Rare


Past Drug Use History: None Reported





- Past Family History


  ** Brother(s)


Family Medical History: Cancer


Additional Family Medical History / Comment(s): Colon





General Exam


General appearance: alert, in no apparent distress


Head exam: Present: atraumatic, normocephalic


Eye exam: Present: normal appearance.  Absent: scleral icterus, conjunctival 

injection


Neck exam: Present: normal inspection


Respiratory exam: Present: normal lung sounds bilaterally, wheezes.  Absent: 

rales, rhonchi, stridor, accessory muscle use, decreased breath sounds


Cardiovascular Exam: Present: regular rate, normal rhythm, normal heart sounds. 

Absent: systolic murmur, diastolic murmur, rubs, gallop


GI/Abdominal exam: Present: soft, tenderness, other (There is a pain pump in the

right side of the abdomen).  Absent: distended, guarding, rebound, rigid


Extremities exam: Present: normal inspection, normal capillary refill.  Absent: 

pedal edema, calf tenderness


Back exam: Present: normal inspection.  Absent: CVA tenderness (R), CVA 

tenderness (L)


Neurological exam: Present: alert


Skin exam: Present: warm, dry, intact, normal color.  Absent: rash





Course


                                   Vital Signs











  05/18/23 05/18/23 05/18/23





  15:03 17:17 19:15


 


Temperature 98 F  


 


Pulse Rate 93 76 80


 


Respiratory 20 18 18





Rate   


 


Blood Pressure 125/80 124/91 131/90


 


O2 Sat by Pulse 94 L 97 96





Oximetry   














Medical Decision Making





- Lab Data


Result diagrams: 


                                 05/18/23 15:28





                                 05/18/23 15:28


                                   Lab Results











  05/18/23 05/18/23 05/18/23 Range/Units





  15:28 15:28 15:28 


 


WBC  10.0    (3.8-10.6)  k/uL


 


RBC  4.50    (4.30-5.90)  m/uL


 


Hgb  13.8    (13.0-17.5)  gm/dL


 


Hct  41.9    (39.0-53.0)  %


 


MCV  93.1    (80.0-100.0)  fL


 


MCH  30.8    (25.0-35.0)  pg


 


MCHC  33.1    (31.0-37.0)  g/dL


 


RDW  13.1    (11.5-15.5)  %


 


Plt Count  247    (150-450)  k/uL


 


MPV  7.8    


 


Neutrophils %  74    %


 


Lymphocytes %  18    %


 


Monocytes %  5    %


 


Eosinophils %  2    %


 


Basophils %  0    %


 


Neutrophils #  7.4    (1.3-7.7)  k/uL


 


Lymphocytes #  1.8    (1.0-4.8)  k/uL


 


Monocytes #  0.5    (0-1.0)  k/uL


 


Eosinophils #  0.2    (0-0.7)  k/uL


 


Basophils #  0.0    (0-0.2)  k/uL


 


Sodium   138   (137-145)  mmol/L


 


Potassium   3.7   (3.5-5.1)  mmol/L


 


Chloride   99   ()  mmol/L


 


Carbon Dioxide   31 H   (22-30)  mmol/L


 


Anion Gap   8   mmol/L


 


BUN   12   (9-20)  mg/dL


 


Creatinine   0.35 L   (0.66-1.25)  mg/dL


 


Est GFR (CKD-EPI)AfAm   >90   (>60 ml/min/1.73 sqM)  


 


Est GFR (CKD-EPI)NonAf   >90   (>60 ml/min/1.73 sqM)  


 


Glucose   307 H   (74-99)  mg/dL


 


Plasma Lactic Acid Mark    2.0  (0.7-2.0)  mmol/L


 


Calcium   8.7   (8.4-10.2)  mg/dL


 


Total Bilirubin   0.8   (0.2-1.3)  mg/dL


 


AST   22   (17-59)  U/L


 


ALT   19   (4-49)  U/L


 


Alkaline Phosphatase   83   ()  U/L


 


C-Reactive Protein     (<1.0)  mg/dL


 


Total Protein   6.5   (6.3-8.2)  g/dL


 


Albumin   3.6   (3.5-5.0)  g/dL


 


Urine Color     


 


Urine Appearance     (Clear)  


 


Urine pH     (5.0-8.0)  


 


Ur Specific Gravity     (1.001-1.035)  


 


Urine Protein     (Negative)  


 


Urine Glucose (UA)     (Negative)  


 


Urine Ketones     (Negative)  


 


Urine Blood     (Negative)  


 


Urine Nitrite     (Negative)  


 


Urine Bilirubin     (Negative)  


 


Urine Urobilinogen     (<2.0)  mg/dL


 


Ur Leukocyte Esterase     (Negative)  














  05/18/23 05/18/23 Range/Units





  15:28 16:25 


 


WBC    (3.8-10.6)  k/uL


 


RBC    (4.30-5.90)  m/uL


 


Hgb    (13.0-17.5)  gm/dL


 


Hct    (39.0-53.0)  %


 


MCV    (80.0-100.0)  fL


 


MCH    (25.0-35.0)  pg


 


MCHC    (31.0-37.0)  g/dL


 


RDW    (11.5-15.5)  %


 


Plt Count    (150-450)  k/uL


 


MPV    


 


Neutrophils %    %


 


Lymphocytes %    %


 


Monocytes %    %


 


Eosinophils %    %


 


Basophils %    %


 


Neutrophils #    (1.3-7.7)  k/uL


 


Lymphocytes #    (1.0-4.8)  k/uL


 


Monocytes #    (0-1.0)  k/uL


 


Eosinophils #    (0-0.7)  k/uL


 


Basophils #    (0-0.2)  k/uL


 


Sodium    (137-145)  mmol/L


 


Potassium    (3.5-5.1)  mmol/L


 


Chloride    ()  mmol/L


 


Carbon Dioxide    (22-30)  mmol/L


 


Anion Gap    mmol/L


 


BUN    (9-20)  mg/dL


 


Creatinine    (0.66-1.25)  mg/dL


 


Est GFR (CKD-EPI)AfAm    (>60 ml/min/1.73 sqM)  


 


Est GFR (CKD-EPI)NonAf    (>60 ml/min/1.73 sqM)  


 


Glucose    (74-99)  mg/dL


 


Plasma Lactic Acid Mark    (0.7-2.0)  mmol/L


 


Calcium    (8.4-10.2)  mg/dL


 


Total Bilirubin    (0.2-1.3)  mg/dL


 


AST    (17-59)  U/L


 


ALT    (4-49)  U/L


 


Alkaline Phosphatase    ()  U/L


 


C-Reactive Protein  14.3 H   (<1.0)  mg/dL


 


Total Protein    (6.3-8.2)  g/dL


 


Albumin    (3.5-5.0)  g/dL


 


Urine Color   Yellow  


 


Urine Appearance   Clear  (Clear)  


 


Urine pH   6.5  (5.0-8.0)  


 


Ur Specific Gravity   1.038 H  (1.001-1.035)  


 


Urine Protein   Trace H  (Negative)  


 


Urine Glucose (UA)   4+ H  (Negative)  


 


Urine Ketones   Trace H  (Negative)  


 


Urine Blood   Negative  (Negative)  


 


Urine Nitrite   Negative  (Negative)  


 


Urine Bilirubin   Negative  (Negative)  


 


Urine Urobilinogen   3.0  (<2.0)  mg/dL


 


Ur Leukocyte Esterase   Negative  (Negative)  














Disposition


Clinical Impression: 


 Abdominal pain





Disposition: ADMITTED AS IP TO THIS [facility]


Condition: Good


Is patient prescribed a controlled substance at d/c from ED?: No

## 2023-05-18 NOTE — P.GSHP
History of Present Illness


H&P Date: 05/18/23











CHIEF COMPLAINT: Intractable abdominal pain





HISTORY OF PRESENT ILLNESS: The patient is a 72 year old male recently 

discharged from the hospital over a month ago with atypical chest pain and 

epigastric abdominal pain.  He was seen in the office 2 weeks ago reporting 

doing well.  He comes in with acute right lower quadrant abdominal pain.  He has

personal history of severe intra-abdominal adhesions including bowel resections 

in the past.  Last loss of adhesions over one year ago.  Due to severity of 

abdominal pain 10 out of 10 nature of his pain, he presented to the emergency 

room.  Reports difficulty with breathing.  He does have chronic obstructive 

pulmonary disease and oxygen dependent.  Patient presents to the emergency room 

due to severe abdominal pain.





PAST MEDICAL HISTORY: 


See list and reviewed





PAST SURGICAL HISTORY: 


See list and reviewed





MEDICATIONS: 


See list and reviewed





ALLERGIES: 


See list and reviewed





SOCIAL HISTORY: See list and reviewed





FAMILY HISTORY:  See list and reviewed





REVIEW OF ORGAN SYSTEMS:


CONSTITUTIONAL:  No fevers or chills. No recent weight loss.


EYES: Denies any trouble with vision. No glasses.


HEENT:  No difficulties with hearing. No nosebleeds.  No difficulty swallowing. 


RESPIRATORY: Recent pneumonia over 1-2 months ago.  Oxygen dependent chronic 

obstructive pulmonary disease


CARDIOVASCULAR:  No recent chest pain.  Has hypertensive heart disease.  Has 

obstructive sleep apnea.


GASTROINTESTINAL: Denies fatty food intolerance.  Personal history of bowel 

resection.  Past history of pancreatitis.  Has gastroesophageal reflux disease.


GENITOURINARY:  Denies any blood in urine or increased urinary frequency.  


NEUROLOGICAL:  Has a neuropathy of the feet.  No recent stroke.


MUSCULOSKELETAL:  Has back pain, stiffness or joint arthritis. 


SKIN: No current skin cancer. No rash.


PSYCHIATRIC:  Denies current depression or suicidal thoughts.


ENDOCRINE:  Denies current thyroid disorders. Denies any blood sugar glucose 

intolerance.


HEME/LYMPHATIC: Denies any lumps and bumps around the neck. No recent deep 

venous thrombosis.  Past thrombotic event.


ALLERGY/IMMUNOLOGY:  No immunoglobulin therapy. No immune deficiencies.


BREAST: Denies current breast lumps, pain or nipple discharge.





PHYSICAL EXAM:


VITALS: Reviewed


CONSTITUTIONAL:  Well developed and in moderate distress. 


EYES:  Conjuctivae without sclera icterus.  Extraocular movements grossly 

intact. 


HEAD, EARS, NOSE, THROAT: Moist buccal mucosa. Head is atraumatic, normocephalic

. Hears conversational speech. No nasal drainage. 


NECK:  Supple. No JV distention. No thyroidomegaly.


RESPIRATORY:  Non-labored respirations and equal bilateral excursions. No gross 

wheezes. 


CARDIOVASCULAR:   Palpable 2+ radial pulses.


ABDOMEN:  Tender right lower quadrant, moderate severe.  Palpable pain pump at 

right lower quadrant.


LYMPH: No neck lymphadenopathy. 


MUSCULOSKELETAL:  No clubbing cyanosis or edema


SKIN:  Warm and well perfused with good skin turgor.


NEUROLOGIC: Cranial nerves II through XII grossly intact.  No focal or 

lateralizing signs. 


PSYCH:  Appropriate affect.  Alert and oriented to person, place and time. 

Displays appropriate insight.





CLINCAL LABS: Reviewed.  WBC normal.





IMAGING: Independently reviewed.  CT of the abdomen and pelvis demonstrates 

stool burden of the transverse descending colon.  Pain pump along the 

subcutaneous tissue of the right lower abdomen.  No free air.  This is my 

independent interpretation.





RADIOLOGY: Report reviewed





RECORDS: previous old records reviewed operative report May 2022 demonstrated a 

severe abdominal adhesions








ASSESSMENT: 


1.  Intractable abdominal right upper/right lower quadrant pain due to 

peritoneal adhesions


2.  Chronic pain syndrome


3.  Status post pain pump insertion, right lower quadrant


4.  Morbid obesity due to excess calories, BMI 36.8


5.  Congestive heart failure


6.  Ischemic cardiomyopathy


7.  Chronic anticoagulant use


8.  Chronic obstructive pulmonary disease with dependence on oxygen


9.  Neuropathy


10.  Personal history severe pelvic adhesions including intra-loop adhesions of 

the pelvis.


11.  History of pneumonia, for 1 month





PLAN: 


1.  Patient comes in with intractable abdominal pain and multiple 

hospitalizations related to his pain.  Recommend admission with urgent lysis of 

adhesions, robotic-assisted approach.


2.  He is elevated risk due to pre-existing history of multiple comorbidities, 

chronic obstructive pulmonary disease, moderate to severe oxygen dependent.  


3.  Medical management per medicine





ADVANCE DIRECTIVE: 








Thank you for this kind consultation.





Past Medical History


Past Medical History: COPD, Deep Vein Thrombosis (DVT), GERD/Reflux, 

Hyperlipidemia, Hypertension, Pneumonia, Sleep Apnea/CPAP/BIPAP


Additional Past Medical History / Comment(s): COVID IN NOVEMBER 2021.  CHRONIC 

ABD PAIN HAS NOT FOUND DEFINITIVE CAUSE but has a abdominal dilaudid pain pump. 

Hx. of Diverticulitis, Pneumonia (2018),  oxygen @ 5L ATC,  chronic back pain,  

uses CPAP, hx. DVT RT leg , neuropathy feet, hx of pancreatitis.bilateral 

cataracts removed


History of Any Multi-Drug Resistant Organisms: None Reported


Past Surgical History: Appendectomy, Bowel Resection, Cholecystectomy


Additional Past Surgical History / Comment(s): BOWEL RESECTION.  Colonoscopy, 

exp. laparotomy, EGD, LASIK EYE SURGERY, pain pump


Past Anesthesia/Blood Transfusion Reactions: No Reported Reaction


Past Psychological History: No Psychological Hx Reported


Smoking Status: Former smoker


Past Alcohol Use History: Rare


Past Drug Use History: None Reported





- Past Family History


  ** Brother(s)


Family Medical History: Cancer


Additional Family Medical History / Comment(s): Colon





Medications and Allergies


                                Home Medications











 Medication  Instructions  Recorded  Confirmed  Type


 


Spironolactone [Aldactone] 25 mg PO DAILY 11/23/15 05/18/23 History


 


Omeprazole 40 mg PO DAILY 05/25/22 05/18/23 History


 


lisinopriL [Zestril] 5 mg PO DAILY 05/25/22 05/18/23 History


 


oxyCODONE-APAP 10-325MG [Percocet 1 tab PO Q8H 05/25/22 05/18/23 History





 mg]    


 


tiZANidine HCL 4 mg PO BID PRN 05/25/22 05/18/23 History


 


Albuterol Sulfate [Albuterol 2 puff PO RT-QID 04/20/23 05/18/23 History





Sulfate Hfa]    


 


Atorvastatin [Lipitor] 20 mg PO HS 04/20/23 05/18/23 History


 


Dextroamphetamine/Amphetamine 30 mg PO BID@0900,1600 04/20/23 05/18/23 History





[Adderall]    


 


Ipratropium Nebulized [Atrovent 0.5 mg INHALATION RT-QID 04/20/23 05/18/23 

History





Nebulized 0.2 MG/ML]    


 


Isosorbide Dinitrate 30 mg PO DAILY 04/20/23 05/18/23 History


 


INSULIN ASPART (NovoLOG) [NovoLOG 5 unit SQ AC-TID PRN 05/18/23 05/18/23 History





(formulary)]    








                                    Allergies











Allergy/AdvReac Type Severity Reaction Status Date / Time


 


bee pollen Allergy  Anaphylaxis Verified 05/18/23 18:23


 


Iodinated Contrast Media Allergy  Anaphylaxis Verified 05/18/23 18:23





[Iodinated Contrast Media -     





IV Dye]     


 


metformin AdvReac  Abdominal Verified 05/18/23 18:23





   Pain  














Surgical - Exam


                                   Vital Signs











Temp Pulse Resp BP Pulse Ox


 


 98 F   93   20   125/80   94 L


 


 05/18/23 15:03  05/18/23 15:03  05/18/23 15:03  05/18/23 15:03  05/18/23 15:03














Results





- Labs





                                 05/18/23 15:28





                                 05/18/23 15:28


                  Abnormal Lab Results - Last 24 Hours (Table)











  05/18/23 05/18/23 05/18/23 Range/Units





  15:28 15:28 16:25 


 


Carbon Dioxide  31 H    (22-30)  mmol/L


 


Creatinine  0.35 L    (0.66-1.25)  mg/dL


 


Glucose  307 H    (74-99)  mg/dL


 


C-Reactive Protein   14.3 H   (<1.0)  mg/dL


 


Ur Specific Gravity    1.038 H  (1.001-1.035)  


 


Urine Protein    Trace H  (Negative)  


 


Urine Glucose (UA)    4+ H  (Negative)  


 


Urine Ketones    Trace H  (Negative)  








                                 Diabetes panel











  05/18/23 Range/Units





  15:28 


 


Sodium  138  (137-145)  mmol/L


 


Potassium  3.7  (3.5-5.1)  mmol/L


 


Chloride  99  ()  mmol/L


 


Carbon Dioxide  31 H  (22-30)  mmol/L


 


BUN  12  (9-20)  mg/dL


 


Creatinine  0.35 L  (0.66-1.25)  mg/dL


 


Glucose  307 H  (74-99)  mg/dL


 


Calcium  8.7  (8.4-10.2)  mg/dL


 


AST  22  (17-59)  U/L


 


ALT  19  (4-49)  U/L


 


Alkaline Phosphatase  83  ()  U/L


 


Total Protein  6.5  (6.3-8.2)  g/dL


 


Albumin  3.6  (3.5-5.0)  g/dL








                                  Calcium panel











  05/18/23 Range/Units





  15:28 


 


Calcium  8.7  (8.4-10.2)  mg/dL


 


Albumin  3.6  (3.5-5.0)  g/dL








                                 Pituitary panel











  05/18/23 Range/Units





  15:28 


 


Sodium  138  (137-145)  mmol/L


 


Potassium  3.7  (3.5-5.1)  mmol/L


 


Chloride  99  ()  mmol/L


 


Carbon Dioxide  31 H  (22-30)  mmol/L


 


BUN  12  (9-20)  mg/dL


 


Creatinine  0.35 L  (0.66-1.25)  mg/dL


 


Glucose  307 H  (74-99)  mg/dL


 


Calcium  8.7  (8.4-10.2)  mg/dL








                                  Adrenal panel











  05/18/23 Range/Units





  15:28 


 


Sodium  138  (137-145)  mmol/L


 


Potassium  3.7  (3.5-5.1)  mmol/L


 


Chloride  99  ()  mmol/L


 


Carbon Dioxide  31 H  (22-30)  mmol/L


 


BUN  12  (9-20)  mg/dL


 


Creatinine  0.35 L  (0.66-1.25)  mg/dL


 


Glucose  307 H  (74-99)  mg/dL


 


Calcium  8.7  (8.4-10.2)  mg/dL


 


Total Bilirubin  0.8  (0.2-1.3)  mg/dL


 


AST  22  (17-59)  U/L


 


ALT  19  (4-49)  U/L


 


Alkaline Phosphatase  83  ()  U/L


 


Total Protein  6.5  (6.3-8.2)  g/dL


 


Albumin  3.6  (3.5-5.0)  g/dL

## 2023-05-19 LAB
GLUCOSE BLD-MCNC: 134 MG/DL (ref 70–110)
GLUCOSE BLD-MCNC: 148 MG/DL (ref 70–110)
GLUCOSE BLD-MCNC: 180 MG/DL (ref 70–110)
GLUCOSE BLD-MCNC: 208 MG/DL (ref 70–110)

## 2023-05-19 PROCEDURE — 0DN84ZZ RELEASE SMALL INTESTINE, PERCUTANEOUS ENDOSCOPIC APPROACH: ICD-10-PCS

## 2023-05-19 PROCEDURE — 0WQF4ZZ REPAIR ABDOMINAL WALL, PERCUTANEOUS ENDOSCOPIC APPROACH: ICD-10-PCS

## 2023-05-19 PROCEDURE — 0DNW4ZZ RELEASE PERITONEUM, PERCUTANEOUS ENDOSCOPIC APPROACH: ICD-10-PCS

## 2023-05-19 PROCEDURE — 8E0W4CZ ROBOTIC ASSISTED PROCEDURE OF TRUNK REGION, PERCUTANEOUS ENDOSCOPIC APPROACH: ICD-10-PCS

## 2023-05-19 RX ADMIN — SPIRONOLACTONE SCH MG: 25 TABLET, FILM COATED ORAL at 08:21

## 2023-05-19 RX ADMIN — ASPIRIN SCH: 325 TABLET ORAL at 08:19

## 2023-05-19 RX ADMIN — INSULIN ASPART SCH: 100 INJECTION, SOLUTION INTRAVENOUS; SUBCUTANEOUS at 17:41

## 2023-05-19 RX ADMIN — ISOSORBIDE MONONITRATE SCH MG: 30 TABLET, EXTENDED RELEASE ORAL at 08:20

## 2023-05-19 RX ADMIN — CEFAZOLIN SCH MLS: 330 INJECTION, POWDER, FOR SOLUTION INTRAMUSCULAR; INTRAVENOUS at 20:00

## 2023-05-19 RX ADMIN — OXYCODONE HYDROCHLORIDE AND ACETAMINOPHEN SCH EACH: 10; 325 TABLET ORAL at 11:49

## 2023-05-19 RX ADMIN — ASPIRIN SCH: 325 TABLET ORAL at 15:30

## 2023-05-19 RX ADMIN — ATORVASTATIN CALCIUM SCH MG: 20 TABLET, FILM COATED ORAL at 21:53

## 2023-05-19 RX ADMIN — IPRATROPIUM BROMIDE AND ALBUTEROL SULFATE SCH ML: .5; 3 SOLUTION RESPIRATORY (INHALATION) at 12:47

## 2023-05-19 RX ADMIN — PANTOPRAZOLE SODIUM SCH MG: 40 INJECTION, POWDER, FOR SOLUTION INTRAVENOUS at 08:21

## 2023-05-19 RX ADMIN — OXYCODONE HYDROCHLORIDE AND ACETAMINOPHEN SCH EACH: 10; 325 TABLET ORAL at 03:20

## 2023-05-19 RX ADMIN — HYDROMORPHONE HYDROCHLORIDE PRN MG: 1 INJECTION, SOLUTION INTRAMUSCULAR; INTRAVENOUS; SUBCUTANEOUS at 03:48

## 2023-05-19 RX ADMIN — INSULIN ASPART SCH UNIT: 100 INJECTION, SOLUTION INTRAVENOUS; SUBCUTANEOUS at 11:49

## 2023-05-19 RX ADMIN — HYDROMORPHONE HYDROCHLORIDE PRN MG: 1 INJECTION, SOLUTION INTRAMUSCULAR; INTRAVENOUS; SUBCUTANEOUS at 00:24

## 2023-05-19 RX ADMIN — OXYCODONE HYDROCHLORIDE AND ACETAMINOPHEN SCH: 10; 325 TABLET ORAL at 21:51

## 2023-05-19 RX ADMIN — HYDROMORPHONE HYDROCHLORIDE PRN MG: 1 INJECTION, SOLUTION INTRAMUSCULAR; INTRAVENOUS; SUBCUTANEOUS at 15:27

## 2023-05-19 RX ADMIN — INSULIN ASPART SCH: 100 INJECTION, SOLUTION INTRAVENOUS; SUBCUTANEOUS at 21:52

## 2023-05-19 RX ADMIN — CEFAZOLIN SCH MLS/HR: 330 INJECTION, POWDER, FOR SOLUTION INTRAMUSCULAR; INTRAVENOUS at 05:25

## 2023-05-19 RX ADMIN — IPRATROPIUM BROMIDE AND ALBUTEROL SULFATE SCH: .5; 3 SOLUTION RESPIRATORY (INHALATION) at 16:28

## 2023-05-19 RX ADMIN — ENOXAPARIN SODIUM SCH: 40 INJECTION SUBCUTANEOUS at 08:20

## 2023-05-19 RX ADMIN — CEFAZOLIN SCH MLS: 330 INJECTION, POWDER, FOR SOLUTION INTRAMUSCULAR; INTRAVENOUS at 19:40

## 2023-05-19 RX ADMIN — HYDROMORPHONE HYDROCHLORIDE PRN MG: 1 INJECTION, SOLUTION INTRAMUSCULAR; INTRAVENOUS; SUBCUTANEOUS at 21:56

## 2023-05-19 RX ADMIN — ALBUTEROL SULFATE SCH PUFF: 90 AEROSOL, METERED RESPIRATORY (INHALATION) at 08:51

## 2023-05-19 RX ADMIN — IPRATROPIUM BROMIDE SCH MG: 0.5 SOLUTION RESPIRATORY (INHALATION) at 08:52

## 2023-05-19 RX ADMIN — IPRATROPIUM BROMIDE AND ALBUTEROL SULFATE SCH ML: .5; 3 SOLUTION RESPIRATORY (INHALATION) at 22:13

## 2023-05-19 NOTE — P.OP
Date of Procedure: 05/19/23


Description of Procedure: 





SURGEON:  VÍCTOR FLYNN MD


PREOPERATIVE DIAGNOSES:  


1.  Intractable abdominal pain


2.  Chronic pain syndrome


3.  Status post pain pump insertion, right lower quadrant


4.  Morbid obesity due to excess calories, BMI 38.6


5.  Congestive heart failure with diastolic dysfunction


6.  Ischemic cardiomyopathy


7.  Gastroesophageal reflux disease with erosive esophagitis


8.  Chronic obstructive pulmonary disease, severe with dependence on oxygen


9.  Neuropathy


10.  Personal history of multiple abdominal surgeries and bowel obstruction


11.  Thoracic abdominal aortic aneurysm


12.  Personal history of DVTs


13.  Esophageal dysmotility





POSTOPERATIVE DIAGNOSES:  


1.  Intractable abdominal pain due to internal hernia, intermittent bowel 

obstruction


2.  Chronic pain syndrome


3.  Status post pain pump insertion, right lower quadrant


4.  Morbid obesity due to excess calories, BMI 38.6


5.  Congestive heart failure with diastolic dysfunction


6.  Ischemic cardiomyopathy


7.  Gastroesophageal reflux disease with erosive esophagitis


8.  Chronic obstructive pulmonary disease, severe with dependence on oxygen


9.  Neuropathy


10.  Personal history of multiple abdominal surgeries and bowel obstruction


11.  Thoracic abdominal aortic aneurysm


12.  Personal history of DVTs


13.  Esophageal dysmotility





OPERATION:       


1. Robotic-assisted da Samantha Xi laparoscopic extensive lysis of adhesions over 

1.5 hr


2. Robotic-assisted da Samantha Xi laparoscopic reduction of internal hernia with 

intermittent bowel obstruction, right lower quadrant





COMPLICATIONS:  None.


Anesthesia: GETA, local


Estimated Blood Loss (ml): 5


Pathology: none sent


Condition: stable





OPERATIVE FINDINGS:  


1.  Severe pelvic adhesions involving small bowel and interloop adhesions


2.  Deep intra-abdominal adhesions along the right lower quadrant with internal 

hernia identified and resected


3.  Small bowel viable


4.  Pelvic adhesions with dense interloop adhesions lysed of the abdominal wall


5.  Duction of internal hernia with release of intermittent small bowel 

obstruction





INDICATIONS: The patient is a 72-year-old male who with severe right upper 

quadrant right lower quadrant abdominal pain despite pain pump and history of 

chronic pain syndrome.  He presented with intractable abdominal pain.  He has a 

personal history of bowel obstructions including severe intra-abdominal 

adhesions.  Surgical intervention was described for his intractable abdominal 

pain a personal history of adhesions.  Informed consent was obtained.  Robotic 

assisted laparoscopic approach was described. Benefits and risks of the 

procedure including but not limited to bleeding, infection, injury to the small 

bowel was described. Informed consent was obtained.  





DESCRIPTION OF PROCEDURE: Patient was brought to the operating room, placed in 

supine position. After general induction, the abdomen had been prepped and 

draped in standard sterile fashion. The robotic da Samantha XI system was primed. 





After a timeout protocol was performed, the patient had been prepped and draped 

in standard sterile fashion.





The robot was docked along the right lateral abdomen. The patient was 

repositioned in Trendelenburg position of 7- degrees and left tilt 7. A 5 mm 0 

degrees laparoscopic trocar entry was performed along the left upper quadrant.  

The abdomen was insufflated to 15 mmHg pressure which he tolerated well.  

Diagnostic laparoscopy demonstrated severe intra-abdominal adhesions involving 

the lower midline. No injury to the bowel, viscera or mesentery was identified.





Next, three 8 mm robotic ports were placed along the left lateral abdominal 

wall.  The 5 mm trocar was exchanged for a 8 mm trocar.  Total trochars used 

four 8 mm robotic trochars.  Please note that the ports were placed at least 10 

cm away from the target anatomy. 





Instruments were interchanged using a grasper, vessel sealer.  I had sat at the 

console.  





Extensive lysis of adhesions over 1.5 hour was performed using vessel sealer and

blunt dissection.  Carefully the adhesions were taken down to address the lower 

midline to pelvis.  The small bowel was adherent to the lower abdominal wall.  

The cecum was identified with prior appendectomy identified.  The small bowel 

was investigated from the terminal ileum proximally.  Careful lysis of adhesions

was performed to release the small intestine without enterotomy.  Multiple 

intraloop adhesions were carefully lysed without injury.  At the mid jejunum, an

adhesive band along the retroperitoneum to the right lower quadrant was 

identified creating an internal hernia.  Small bowel was reduced and the 

internal hernia was resected after extensive lysis of adhesions.  Resolution of 

intermittent bowel obstruction was confirmed.  Proximally, dense interloop 

adhesions of the deep pelvis were sharply lysed with adhesions freed from the 

entire abdominal wall.  Hemostasis was checked.





The robot was undocked.





All pneumoperitoneum and instruments were evacuated from the abdominal cavity. 

The incisions were reapproximated using 4-0 Monocryl in an interrupted 

subcuticular fashion. Please note along the trocar sites, local anesthetic was 

placed as a field block prior to insertion of all instruments.  





Liquid glue was applied to the skin.





At the end of the procedure needle, sponge, and instrument count had been 

verified correct by the surgical technician. The patient was transferred to 

postanesthea care unit in stable condition.





Intraoperative images including findings were described to the patients family.

## 2023-05-19 NOTE — CT
EXAMINATION TYPE: High-resolution CT chest

 

DATE OF EXAM: 5/19/2023

 

COMPARISON: 11/16/2018

 

HISTORY: 72-year-old male Pulmonary Fibrosis.

 

TECHNIQUE: Thin cut scanning of the chest utilizing 1 mm slice thickness and 1 cm gap per HRCT protoc
ol. No IV contrast. Both prone and supine imaging is utilized.

 

CT DLP: 2012.9 mGycm

Automated exposure control for dose reduction was used.

 

 

FINDINGS: 

Heart normal size without pericardial effusion. LAD coronary artery calcifications are present.

 

Mildly aneurysmal ascending aorta 4.2 cm versus 4.0 cm, previously. Conventional arterial sclerotic a
natomy.

 

Mildly enlarged caliber to the main right and left pulmonary arteries measuring up to 2.7 cm each sug
gesting underlying pulmonary arterial hypertension.

 

Mild bilateral gynecomastia. Allowing for HRCT technique, no obvious thoracic lymphadenopathy.

 

Diffuse interstitial infiltrates throughout the left lung. Scattered areas of pleural parenchymal thi
ckening are also present as well as patchy groundglass changes. More extensive patchy consolidation a
nd volume loss at the left base. Given nodular and focal configuration to some of these areas, follow
-up after treatment is recommended to exclude any underlying suspicious pulmonary nodules or masses.

 

No kisha honeycombing

 

Visualized upper abdomen shows postcholecystectomy change.

 

Degenerative change of both shoulders. Spinal stimulator lead extending to the lower thoracic spinal 
canal. There is moderate to advanced emphysematous change present.

 

 

IMPRESSION: 

 

1. THE DOMINANT PATTERN THROUGHOUT THE LUNGS IS THAT OF COPD WITH MODERATE TO SEVERE EMPHYSEMA. ASSOC
IATED PULMONARY ARTERIAL HYPERTENSION.

2. COMPARED TO 2018, THERE ARE EXTENSIVE NEW INTERSTITIAL AND PATCHY INFILTRATES/OPACITIES ON THE LEF
T. SOME OF THESE HAVE A NODULAR CONFIGURATION. THERE IS A MORE MASSLIKE CONFIGURATION AT THE LEFT BAS
E. CORRELATE FOR UNDERLYING PNEUMONIA OR ATYPICAL INFECTIONS.

3. FOLLOW-UP AFTER ANY POTENTIAL TREATMENT TO ASSESS FOR CLEARANCE AND EXCLUDE UNDERLYING NODULES/MAS
S.

4. NO HONEYCOMBING TO SUGGEST IPF/UIP.

5. MILD ANEURYSM ASCENDING AORTA AT 4.2 CM, SLIGHTLY INCREASED FROM 4.0 CM IN 2018.

## 2023-05-19 NOTE — XR
EXAMINATION TYPE: XR chest 2V

 

DATE OF EXAM: 5/19/2023

 

COMPARISON: Chest x-ray April 20, 2023

 

HISTORY: Shortness of breath

 

TECHNIQUE:  Frontal and lateral views of the chest are obtained.

 

FINDINGS:  Chronic parenchymal changes with elevated left hemidiaphragm is redemonstrated. There is i
ncreased opacity in the bilateral mid to lower lungs.  The cardiac silhouette size is stable and with
in normal limits.   The osseous structures are intact.

 

IMPRESSION:  Chronic changes with new left greater than right bilateral mid to lower lung infiltrates
 and/or edema felt present.

## 2023-05-19 NOTE — P.CONS
History of Present Illness





- Reason for Consult


Consult date: 05/19/23


Medical management





- Chief Complaint


Severe shortness of breath associated of the right upper abdominal pain 





- History of Present Illness





Dictation on medical consult requested by the attending Dr. Arthur the 

surgeon.





Date of consult 05/19/2023.


Consultation by Dr. Hayward internal medicine.





Patient presented to the emergency room on 05/15/2020 3 in the evening with to 

complain first one is shortness of breath and despite that he has oxygenation at

home and he is on chronic oxygen due to his respiratory failure and also on CPAP

apparently has been now progressive and decreased air entry in both sides of the

lung basis and with the obtaining the chest x-ray nondiagnostic and indicating 

infiltrate however patient did not have no fever or chills or leukocytosis or 

expectoration with the underlying COPD but pulmonary fibrosis is a highly 

suspected versus infiltrate I requested computed tomography scan without 

contrast high his elution for clarifying these issue with the no leukocytosis or

fever or chills or sputum production


Patient also has ALLERGY to dye and iodine with the presence of a second 

complaint of abdominal pain we do not starting any preparation for dye has we 

are going to do it without contrast





#2 second complain with the severe abdominal pain patient stated 10 over 10 in 

the right upper quadrant as the patient stated was feeling that he is dying from

the pain and he came to the emergency room with the underlying past history of 

multiple surgery and adhesions, the pain is extremely high inspired the patient 

had pump for Dilaudid in the right abdomen was for many years with a history of 

chronic pain syndrome by Dr. Paz the pain clinic added to pain medication that

he received as well from the pain clinic.





History of present illness: The


Patient was recently admitted to [facility] hospital underwent EGD by Dr. Gamez because of chest pain across the chest and at that time patient seen by

pulmonary as well as cardiology and decided that the pain was not cardiac


Dr. Arthur the surgeon did EGD and found esophageal stenosis and stricture an

d did dilated the esophagus subsequently patient released from the pain 

completely he went home subsequently however these pain is different pain and in

the right upper quadrant with the possibility is very high from adhesions of the

abdomen and patient despite all of the above he could not tolerated.





Patient has history of diabetes mellitus2 and hyperglycemia on this admission 

and he is not on steroid.


As patient admitted with intractable pain under care of Yamila Hurd for

for further evaluation and probable surgical intervention was a laparoscopy for 

clearing the adhesion.





Past medical history he had history of emphysema history of previous Corvette 19

infection and treated, morbid obesity, osteoarthritis of the hip joint, 

hyperlipidemia recurrent nausea, pulmonary hypertension, obstructive sleep 

apnea, testicular hypofunction, chronic pain syndrome, chronic respiratory fail

ure, chronic supplemental of oxygen.


Diabetic neuropathy.


His ALLERGIES:


Metformin, Januvia, livalo, iodine, bees sting, contrast for testing





Positive Corvette in November 2021 at [facility].  Patient despite 

of the vaccination he received from  with a booster


Habits EX-SMOKER stop smoking at 2003 and he used to smoke 2 pack per day.





Family history  has 2 grownup children 1 daughter one son.  New





BMI 36.4 in the office with obesity, chronic oxygen use 3-4 L/m nasal cannula.  

With a history of esophageal dysmotility.





On review of system:


After reviewing the 14 bullet main concern for the patient is severe pain and 

the shortness of breath.  And despite of using oxygen and CPAP and the pain 10 

over 10 or more as he described, he denied any edema of the extremities, denied 

any cough or expectoration, denied any fever or chills, no dizziness no blurred 

vision.





On the physical exam:


Patient was conscious alert oriented able to express his shortness of breath and

severe pain and his frustration.


Head was normocephalic atraumatic and he normal pupil with the squinting and 

right eye lazy as he stated for childhood normal hearing, and normal swallowing.


Neck was supple no JVD no thyromegaly no lymphadenopathy


Normal speech and normal long


Hearing was normal


Chest increased anteroposterior diameter with the decrease entry on the bases 

bilaterally.  With the underlying emphysema and possible scarring or pulmonary 

fibrosis .


Heart was regular sinus rhythm patient from the old record indicating that he 

had a stress test and 2015 was nondiagnostic.  And no evidence of ischemic 

abnormalities


On 2014 he had a carotid duplex study was normal was read by Dr. Danny Garcia 

vascular surgeon.  And he had previous echocardiogram on 11/29/2012 with the 

ejection fraction 55% and he had mild mitral mild tricuspid regurg and mild or 

trace pulmonary regurg and at that time they was presence of gradient mild 

accord hour on the aortic valve with the PE gradient 14 mmHg and the mean 

gradient 8 mmHg his cardiology DrShante Alba however it appeared that patient had

no recent echocardiogram.  No evidence of heart failure or acute MI however he 

had hypertension and he had fatty infiltration of the liver which was diagnosed 

then you [facility].





Abdomen: Morbidly obese multiple scars severe pain in the upper abdomen and the 

right upper quadrant he has in the mid abdomen on the right side the pump for 

pain and multiple scars.  He had a positive bowel sounds but severe tenderness 

with light palpation in the right upper quadrant.  Extremities he had no edema 

and positive pulses and underlying arthritis of the joint including knee as well

as a spine with osteoarthritis obesity KI of the skin no evidence of bruises.





Assessment:


#1 after review the laboratories with the no evidence of leukocytosis however 

his C-reactive protein 14.3 extremely high his chemistry was normal and liver 

enzyme was normal, creatinine 0.35 and glucose 307.  With the underlying 

uncontrolled diabetes, GFR for non- more than 90 on 05/18/2023 yesterday 

on admission also his white count was 


WBC 10 hemoglobin 13.8 and platelet count 247


#2 they did a CAT scan in the ER of the abdomen and pelvis and found that left 

lower lungs air space opacity and emphysema within the right lower lung with the

predominantly in the right middle lobe.  Otherwise no other finding by the 

computed tomography scan and also found moderate to severe L3-L4 spinal canal 

stenosis


#3 underlying pulmonary fibrosis.  In the x-ray as infiltrate highly considered 

and we requested the computed tomography scan high resolution to clarify the 

issue was no clear evidence of infection meanwhile we order pro-calcitonin as 

well as any T pro-BMP with no previous history of congestive heart failure or 

MI.


#4 intractable abdominal pain more than 10 over 10 with the possibility of 

adhesions from his recurrent surgery


#5 history of esophageal stenosis and stricture and he had a dilated dictation b

y EGD in his last hospitalization.





Plan:


#1 we'll cover with insulin to scale #2 we'll order an anemia T pro-BMP, and 

pro-calcitonin No. 2 we'll order the computed tomography scan high residual 

evaluation to clarify the issue of pulmonary fibrosis versus pneumonia with no 

clinical evidence of pneumonia with the association with severe shortness of 

breath.





#2 continue home medication


#3 Dr. Martin probably will do for further investigation of laparoscopy for 

evaluation of these severe agitation and pain.


#4 may patient to use his CPAP that at home in the hospital





Past Medical History


Past Medical History: COPD, Diabetes Mellitus, Deep Vein Thrombosis (DVT), 

GERD/Reflux, Hyperlipidemia, Hypertension, Pneumonia, Sleep Apnea/CPAP/BIPAP


Additional Past Medical History / Comment(s): COVID IN NOVEMBER 2021.  CHRONIC 

ABD PAIN HAS NOT FOUND DEFINITIVE CAUSE but has a abdominal dilaudid pain pump. 

Hx. of Diverticulitis, Pneumonia (2018),  oxygen @ 5L ATC,  chronic back pain,  

uses CPAP, hx. DVT RT leg , neuropathy feet, hx of pancreatitis.bilateral 

cataracts removed, Diabetes (new diagnosis 2023/possible steroid induced per 

patient)


History of Any Multi-Drug Resistant Organisms: None Reported


Past Surgical History: Appendectomy, Bowel Resection, Cholecystectomy


Additional Past Surgical History / Comment(s): BOWEL RESECTION.  Colonoscopy, 

exp. laparotomy, EGD, LASIK EYE SURGERY, pain pump


Past Anesthesia/Blood Transfusion Reactions: No Reported Reaction


Past Psychological History: No Psychological Hx Reported


Smoking Status: Former smoker


Past Alcohol Use History: Rare


Additional Past Alcohol Use History / Comment(s): quit smoking 2001, smoked 1-

2ppd since age of 18


Past Drug Use History: Marijuana


Additional Drug Use History / Comment(s): Edibles 1-2 times a week for pain 

control





- Past Family History


  ** Brother(s)


Family Medical History: Cancer


Additional Family Medical History / Comment(s): Colon





Medications and Allergies


                                Home Medications











 Medication  Instructions  Recorded  Confirmed  Type


 


Spironolactone [Aldactone] 25 mg PO DAILY 11/23/15 05/18/23 History


 


Omeprazole 40 mg PO DAILY 05/25/22 05/18/23 History


 


lisinopriL [Zestril] 5 mg PO DAILY 05/25/22 05/18/23 History


 


oxyCODONE-APAP 10-325MG [Percocet 1 tab PO Q8H 05/25/22 05/18/23 History





 mg]    


 


tiZANidine HCL 4 mg PO BID PRN 05/25/22 05/18/23 History


 


Albuterol Sulfate [Albuterol 2 puff PO RT-QID 04/20/23 05/18/23 History





Sulfate Hfa]    


 


Atorvastatin [Lipitor] 20 mg PO HS 04/20/23 05/18/23 History


 


Dextroamphetamine/Amphetamine 30 mg PO BID@0900,1600 04/20/23 05/18/23 History





[Adderall]    


 


Ipratropium Nebulized [Atrovent 0.5 mg INHALATION RT-QID 04/20/23 05/18/23 

History





Nebulized 0.2 MG/ML]    


 


Isosorbide Dinitrate 30 mg PO DAILY 04/20/23 05/18/23 History


 


INSULIN ASPART (NovoLOG) [NovoLOG 5 unit SQ AC-TID PRN 05/18/23 05/18/23 History





(formulary)]    








                                    Allergies











Allergy/AdvReac Type Severity Reaction Status Date / Time


 


bee pollen Allergy  Anaphylaxis Verified 05/18/23 18:23


 


Iodinated Contrast Media Allergy  Anaphylaxis Verified 05/18/23 18:23





[Iodinated Contrast Media -     





IV Dye]     


 


metformin AdvReac  Abdominal Verified 05/18/23 18:23





   Pain  














Physical Exam


Vitals: 


                                   Vital Signs











  Temp Pulse Pulse Resp BP BP Pulse Ox


 


 05/19/23 13:56  98.2 F   69  16   107/69  97


 


 05/19/23 12:58   73     


 


 05/19/23 12:47   72     


 


 05/19/23 09:03   68     


 


 05/19/23 08:53   65      96


 


 05/19/23 07:00  98.4 F   66  18   132/77  99


 


 05/19/23 01:20  98.4 F   73  18   146/94  95


 


 05/18/23 23:57  97.9 F   74  18   158/83  97


 


 05/18/23 23:33   61   18  135/94   97


 


 05/18/23 22:46   71   18  126/86   99


 


 05/18/23 20:56   69   18  142/95   97


 


 05/18/23 20:40   78     


 


 05/18/23 20:30   69     


 


 05/18/23 19:15   80   18  131/90   96


 


 05/18/23 17:17   76   18  124/91   97


 


 05/18/23 15:03  98 F  93   20  125/80   94 L








                                Intake and Output











 05/18/23 05/19/23 05/19/23





 22:59 06:59 14:59


 


Intake Total  900 


 


Output Total  200 


 


Balance  700 


 


Intake:   


 


  Intake, IV Titration  900 





  Amount   


 


    Sodium Chloride 0.9% 1,  900 





    000 ml @ 75 mls/hr IV .   





    [id number] [id number] Rx#:[id number]   


 


Output:   


 


  Urine  200 


 


Other:   


 


  # Voids  1 


 


  Weight 112.945 kg 112.945 kg 112.945 kg














Results


CBC & Chem 7: 


                                 05/18/23 15:28





                                 05/18/23 15:28


Labs: 


                  Abnormal Lab Results - Last 24 Hours (Table)











  05/18/23 05/18/23 05/18/23 Range/Units





  15:28 15:28 16:25 


 


Carbon Dioxide  31 H    (22-30)  mmol/L


 


Creatinine  0.35 L    (0.66-1.25)  mg/dL


 


Glucose  307 H    (74-99)  mg/dL


 


POC Glucose (mg/dL)     ()  mg/dL


 


C-Reactive Protein   14.3 H   (<1.0)  mg/dL


 


Ur Specific Gravity    1.038 H  (1.001-1.035)  


 


Urine Protein    Trace H  (Negative)  


 


Urine Glucose (UA)    4+ H  (Negative)  


 


Urine Ketones    Trace H  (Negative)  














  05/18/23 05/19/23 05/19/23 Range/Units





  19:15 06:08 11:14 


 


Carbon Dioxide     (22-30)  mmol/L


 


Creatinine     (0.66-1.25)  mg/dL


 


Glucose     (74-99)  mg/dL


 


POC Glucose (mg/dL)  218 H  208 H  180 H  ()  mg/dL


 


C-Reactive Protein     (<1.0)  mg/dL


 


Ur Specific Gravity     (1.001-1.035)  


 


Urine Protein     (Negative)  


 


Urine Glucose (UA)     (Negative)  


 


Urine Ketones     (Negative)

## 2023-05-20 VITALS — HEART RATE: 80 BPM

## 2023-05-20 VITALS — SYSTOLIC BLOOD PRESSURE: 127 MMHG | RESPIRATION RATE: 16 BRPM | DIASTOLIC BLOOD PRESSURE: 76 MMHG | TEMPERATURE: 99.1 F

## 2023-05-20 LAB
GLUCOSE BLD-MCNC: 162 MG/DL (ref 70–110)
GLUCOSE BLD-MCNC: 164 MG/DL (ref 70–110)

## 2023-05-20 RX ADMIN — INSULIN ASPART SCH UNIT: 100 INJECTION, SOLUTION INTRAVENOUS; SUBCUTANEOUS at 06:24

## 2023-05-20 RX ADMIN — OXYCODONE HYDROCHLORIDE AND ACETAMINOPHEN SCH EACH: 10; 325 TABLET ORAL at 05:06

## 2023-05-20 RX ADMIN — ISOSORBIDE MONONITRATE SCH MG: 30 TABLET, EXTENDED RELEASE ORAL at 08:36

## 2023-05-20 RX ADMIN — INSULIN ASPART SCH UNIT: 100 INJECTION, SOLUTION INTRAVENOUS; SUBCUTANEOUS at 12:08

## 2023-05-20 RX ADMIN — IPRATROPIUM BROMIDE AND ALBUTEROL SULFATE SCH ML: .5; 3 SOLUTION RESPIRATORY (INHALATION) at 12:54

## 2023-05-20 RX ADMIN — ENOXAPARIN SODIUM SCH MG: 40 INJECTION SUBCUTANEOUS at 08:36

## 2023-05-20 RX ADMIN — ASPIRIN SCH: 325 TABLET ORAL at 10:02

## 2023-05-20 RX ADMIN — SPIRONOLACTONE SCH MG: 25 TABLET, FILM COATED ORAL at 08:36

## 2023-05-20 RX ADMIN — HYDROMORPHONE HYDROCHLORIDE PRN MG: 1 INJECTION, SOLUTION INTRAMUSCULAR; INTRAVENOUS; SUBCUTANEOUS at 02:22

## 2023-05-20 RX ADMIN — OXYCODONE HYDROCHLORIDE AND ACETAMINOPHEN SCH EACH: 10; 325 TABLET ORAL at 12:07

## 2023-05-20 RX ADMIN — PANTOPRAZOLE SODIUM SCH MG: 40 INJECTION, POWDER, FOR SOLUTION INTRAVENOUS at 08:36

## 2023-05-20 RX ADMIN — CEFAZOLIN SCH MLS/HR: 330 INJECTION, POWDER, FOR SOLUTION INTRAMUSCULAR; INTRAVENOUS at 02:23

## 2023-05-20 RX ADMIN — IPRATROPIUM BROMIDE AND ALBUTEROL SULFATE SCH ML: .5; 3 SOLUTION RESPIRATORY (INHALATION) at 08:53

## 2023-05-20 RX ADMIN — HYDROMORPHONE HYDROCHLORIDE PRN MG: 1 INJECTION, SOLUTION INTRAMUSCULAR; INTRAVENOUS; SUBCUTANEOUS at 08:46

## 2023-05-20 NOTE — P.DS
Providers


Date of admission: 


05/18/23 19:06





Expected date of discharge: 05/20/23


Attending physician: 


Yamila Martin





Consults: 





                                        





05/18/23 19:19


Consult Physician Routine 


   Consulting Provider: Isac Hayward


   Consult Reason/Comments: Medical management


   Do you want consulting provider notified?: Yes, Notify in am











Primary care physician: 


Isac Adamsángel





[facility] Course: 





Patient doing well today.  He is tolerating his regular diet.  Underwent lysis 

of adhesions yesterday.  He is ambulate him.  He is voiding.  He would like to 

go home today.  Denies nausea or vomiting.  Will plan discharge.  Follow-up with

Dr. Arthur next week.


Patient Condition at Discharge: Good





Plan - Discharge Summary


Discharge Rx Participant: Yes


New Discharge Prescriptions: 


New


   Acetaminophen Tab [Tylenol Tab] 1,000 mg PO Q6HR PRN #30 tablet


     PRN Reason: Pain





Continue


   Spironolactone [Aldactone] 25 mg PO DAILY


   tiZANidine HCL 4 mg PO BID PRN


     PRN Reason: Pain


   lisinopriL [Zestril] 5 mg PO DAILY


   Omeprazole 40 mg PO DAILY


   Atorvastatin [Lipitor] 20 mg PO HS


   oxyCODONE-APAP 10-325MG [Percocet  mg] 1 tab PO Q8H


   Ipratropium Nebulized [Atrovent Nebulized 0.2 MG/ML] 0.5 mg INHALATION RT-QID


   Dextroamphetamine/Amphetamine [Adderall] 30 mg PO BID@0900,1600


   Isosorbide Dinitrate 30 mg PO DAILY


   Albuterol Sulfate [Albuterol Sulfate Hfa] 2 puff PO RT-QID


   INSULIN ASPART (NovoLOG) [NovoLOG (formulary)] 5 unit SQ AC-TID PRN


     PRN Reason: hold if bs <150


Discharge Medication List





Spironolactone [Aldactone] 25 mg PO DAILY 11/23/15 [History]


Omeprazole 40 mg PO DAILY 05/25/22 [History]


lisinopriL [Zestril] 5 mg PO DAILY 05/25/22 [History]


oxyCODONE-APAP 10-325MG [Percocet  mg] 1 tab PO Q8H 05/25/22 [History]


tiZANidine HCL 4 mg PO BID PRN 05/25/22 [History]


Albuterol Sulfate [Albuterol Sulfate Hfa] 2 puff PO RT-QID 04/20/23 [History]


Atorvastatin [Lipitor] 20 mg PO HS 04/20/23 [History]


Dextroamphetamine/Amphetamine [Adderall] 30 mg PO BID@0900,1600 04/20/23 

[History]


Ipratropium Nebulized [Atrovent Nebulized 0.2 MG/ML] 0.5 mg INHALATION RT-QID 

04/20/23 [History]


Isosorbide Dinitrate 30 mg PO DAILY 04/20/23 [History]


INSULIN ASPART (NovoLOG) [NovoLOG (formulary)] 5 unit SQ AC-TID PRN 05/18/23 

[History]


Acetaminophen Tab [Tylenol Tab] 1,000 mg PO Q6HR PRN #30 tablet 05/19/23 [Rx]








Follow up Appointment(s)/Referral(s): 


Yamila Martin MD [STAFF PHYSICIAN] - 05/23/23


(TELEHEALTH - DR WILL CALL YOU BETWEEN 8 am to 6 pm


)


Isac Hayward MD [Primary Care Provider] - 1-2 days


Patient Instructions/Handouts:  Abdominal Pain (ED), Bowel Obstruction (DC), 

Lysis of Abdominal Adhesions (DC)


Activity/Diet/Wound Care/Special Instructions: 


No lifting over 10 pounds in 2 weeks until  June 2nd.  





May shower. 





No bath tub soaks for two weeks until June 2nd.  





Diet as tolerated. 





Use Tylenol, simethicone and ibuprofen or Aleve scheduled for the next 24-48 

hours for best pain relief.





Use ice along incisions for today to prevent swelling.


Discharge Disposition: HOME SELF-CARE

## 2023-05-20 NOTE — P.PN
Progress Note - Text


Progress Note Date: 05/20/23





This is a dictation on the progress note


Date of service 05/20/2023


Dictation by Dr. Lopez.


Please copy of these note to Dr. Padilla pulmonary and critical care for 

follow-up on the CT and the lung problem for the patient and patient advised to 

see him as well


Patient seen today evaluated and he is free of pain after he underwent 

laparoscopic lysis of adhesions in the right upper abdomen and found that he had

hernia also was treated


Patient is free of pain and discharge by the surgical team Dr. Martell is 

covering for Dr. Martin


Patient had computed tomography scan of the chest yesterday, I reviewed it today

and I discussed that with patient and advised to be seeing by Dr. Padilla who 

is the pulmonary physician.  He is taking care of his lung problem with the 

underlying impression from the CT:


Number 1:30 is dominant pattern throughout the lung that COPD with moderate to 

severe emphysema.  Associated with pulmonary arterial hypertension.


#2 his computed tomography scan compared with 2018.  And there are extensive new

interstitial and patchy infiltrate opacities on the left.  Some of these have 

nodular configuration.  There is more masslike configuration at the left base 

correlate for underlying pneumonia or a typical infection.


#3 follow-up after any potential treatment to assess for clearance and excessive

fluid underlying nodule or mass.


#4 no honey combing to suggest IPF or UIP.


#5 mild aneurysm ascending aorta at 4.2 cm.  Slightly increased from 4 cm and 

2018.


Computed tomography scan read by Roberto Johnston MD





In regard of diabetes mellitus2 patient has hemoglobin A1c 8.1, during the 

hospital stay his last blood sugar was 164 and 134 which is fairly controlled 

and to be followed as outpatient with the office visit with Dr. Hayward next week

patient informed.


NT proBNP was 236 not considered evidence of congestive heart failure.  And his 

d-dimer was normal.





Vital sign on discharge temperature 99.1 oral heart rate 91/m, respiratory rate 

16/m patient on 4 L of oxygen and his blood pressure 127/76 with a mean blood 

pressure 93 and stable general condition.





Exam on discharge:


Conscious alert oriented able to ambulate and free of abdominal pain and his 

shortness of breath has been significantly improved.


HEENT no change normocephalic and atraumatic and pupil equal reactive, negative 

oropharynx and normal swallowing


Neck was supple no JVD no thyromegaly no lymphadenopathy trachea midline.


Chest he had increased anteroposterior diameter with the underlying history of 

COPD as well as emphysema however no expectoration and no cough and his white 

count on admission was normal and no evidence of pneumonia however patient need 

for further evaluation with the result of the computed tomography scan as 

mentioned above and very fair the to Dr. Padilla his pulmonary physician as 

outpatient


Abdomen: On palpation no tenderness in the right upper quadrant which has been 

resolved with the laparoscopic done on 5/19/2023 by Dr. Martin.


Extremities: No edema and positive pulses and ambulatory with a history of 

arthritis of the joint and the spine.





Assessment and plan:


#1 patient able to be discharged today


#2 continue home medication


#3 patient need to see Dr. Padilla as outpatient with the results of the 

computed tomography scan next week and discussed with the nurse.


#4 office visit with Dr. MIKE Lopez for follow-up next week I did inform the patient

with the follow-up.


#5 follow-up with Dr. Martin the surgeon per her note and plan.